# Patient Record
Sex: FEMALE | Race: WHITE | NOT HISPANIC OR LATINO | Employment: UNEMPLOYED | URBAN - METROPOLITAN AREA
[De-identification: names, ages, dates, MRNs, and addresses within clinical notes are randomized per-mention and may not be internally consistent; named-entity substitution may affect disease eponyms.]

---

## 2017-08-01 ENCOUNTER — HOSPITAL ENCOUNTER (EMERGENCY)
Facility: HOSPITAL | Age: 42
Discharge: HOME | End: 2017-08-01
Attending: EMERGENCY MEDICINE
Payer: MEDICAID

## 2017-08-01 VITALS
DIASTOLIC BLOOD PRESSURE: 84 MMHG | TEMPERATURE: 99 F | RESPIRATION RATE: 18 BRPM | HEIGHT: 68 IN | HEART RATE: 73 BPM | WEIGHT: 123 LBS | SYSTOLIC BLOOD PRESSURE: 120 MMHG | BODY MASS INDEX: 18.64 KG/M2 | OXYGEN SATURATION: 99 %

## 2017-08-01 DIAGNOSIS — N92.1 METRORRHAGIA: Primary | ICD-10-CM

## 2017-08-01 LAB
ALBUMIN SERPL BCP-MCNC: 3.7 G/DL
ALP SERPL-CCNC: 60 U/L
ALT SERPL W/O P-5'-P-CCNC: 15 U/L
ANION GAP SERPL CALC-SCNC: 10 MMOL/L
AST SERPL-CCNC: 18 U/L
B-HCG UR QL: NEGATIVE
BACTERIA #/AREA URNS AUTO: ABNORMAL /HPF
BASOPHILS # BLD AUTO: 0.02 K/UL
BASOPHILS NFR BLD: 0.2 %
BILIRUB SERPL-MCNC: 0.4 MG/DL
BILIRUB UR QL STRIP: NEGATIVE
BUN SERPL-MCNC: 8 MG/DL
CALCIUM SERPL-MCNC: 9.2 MG/DL
CHLORIDE SERPL-SCNC: 109 MMOL/L
CLARITY UR REFRACT.AUTO: ABNORMAL
CO2 SERPL-SCNC: 20 MMOL/L
COLOR UR AUTO: ABNORMAL
CREAT SERPL-MCNC: 0.8 MG/DL
DIFFERENTIAL METHOD: ABNORMAL
EOSINOPHIL # BLD AUTO: 0.1 K/UL
EOSINOPHIL NFR BLD: 0.8 %
ERYTHROCYTE [DISTWIDTH] IN BLOOD BY AUTOMATED COUNT: 12.2 %
EST. GFR  (AFRICAN AMERICAN): >60 ML/MIN/1.73 M^2
EST. GFR  (NON AFRICAN AMERICAN): >60 ML/MIN/1.73 M^2
GLUCOSE SERPL-MCNC: 114 MG/DL
GLUCOSE UR QL STRIP: NEGATIVE
HCT VFR BLD AUTO: 44.6 %
HGB BLD-MCNC: 15.1 G/DL
HGB UR QL STRIP: ABNORMAL
KETONES UR QL STRIP: NEGATIVE
LEUKOCYTE ESTERASE UR QL STRIP: NEGATIVE
LYMPHOCYTES # BLD AUTO: 1.9 K/UL
LYMPHOCYTES NFR BLD: 21.7 %
MCH RBC QN AUTO: 33.6 PG
MCHC RBC AUTO-ENTMCNC: 33.9 G/DL
MCV RBC AUTO: 99 FL
MICROSCOPIC COMMENT: ABNORMAL
MONOCYTES # BLD AUTO: 0.5 K/UL
MONOCYTES NFR BLD: 5.5 %
NEUTROPHILS # BLD AUTO: 6.3 K/UL
NEUTROPHILS NFR BLD: 71.7 %
NITRITE UR QL STRIP: NEGATIVE
PH UR STRIP: 7 [PH] (ref 5–8)
PLATELET # BLD AUTO: 198 K/UL
PMV BLD AUTO: 10.1 FL
POTASSIUM SERPL-SCNC: 4.2 MMOL/L
PROT SERPL-MCNC: 6.9 G/DL
PROT UR QL STRIP: ABNORMAL
RBC # BLD AUTO: 4.49 M/UL
RBC #/AREA URNS AUTO: >100 /HPF (ref 0–4)
SODIUM SERPL-SCNC: 139 MMOL/L
SP GR UR STRIP: 1.02 (ref 1–1.03)
SQUAMOUS #/AREA URNS AUTO: 4 /HPF
URN SPEC COLLECT METH UR: ABNORMAL
UROBILINOGEN UR STRIP-ACNC: NEGATIVE EU/DL
WBC # BLD AUTO: 8.85 K/UL
WBC #/AREA URNS AUTO: 4 /HPF (ref 0–5)

## 2017-08-01 PROCEDURE — 81000 URINALYSIS NONAUTO W/SCOPE: CPT

## 2017-08-01 PROCEDURE — 81025 URINE PREGNANCY TEST: CPT

## 2017-08-01 PROCEDURE — 99284 EMERGENCY DEPT VISIT MOD MDM: CPT

## 2017-08-01 PROCEDURE — 80053 COMPREHEN METABOLIC PANEL: CPT

## 2017-08-01 PROCEDURE — 85025 COMPLETE CBC W/AUTO DIFF WBC: CPT

## 2017-08-01 RX ORDER — MEDROXYPROGESTERONE ACETATE 5 MG/1
10 TABLET ORAL DAILY
Qty: 20 TABLET | Refills: 0 | Status: SHIPPED | OUTPATIENT
Start: 2017-08-01 | End: 2023-04-10

## 2017-08-01 NOTE — MEDICAL/APP STUDENT
"  History     Chief Complaint   Patient presents with    Vaginal Bleeding     bleeding for 22 days     HPI    Ms. Zepeda is a 41 yr old white female who presents to the ED with complaint of vaginal bleeding which first started on July 13th. She reports suprapubic pain that does not radiate. She confirms copious amounts of vaginal bleeding with clots. She denies fever, chills, and NVD. Ms. Zepeda is afebrile and denies possibility of pregnancy as she had a tubal ligation.        Past Surgical History:   Procedure Laterality Date    TUBAL LIGATION         No family history on file.    Social History   Substance Use Topics    Smoking status: Current Every Day Smoker     Packs/day: 1.00     Types: Cigarettes    Smokeless tobacco: Not on file    Alcohol use No       Review of Systems   Constitutional: Positive for chills. Negative for diaphoresis, fatigue and fever.   Respiratory: Negative for chest tightness, shortness of breath, wheezing and stridor.    Gastrointestinal: Negative for abdominal pain, constipation, diarrhea, nausea and vomiting.   Endocrine: Positive for polyuria.   Genitourinary: Positive for frequency, pelvic pain and vaginal bleeding. Negative for difficulty urinating and flank pain.   Musculoskeletal: Negative for neck pain and neck stiffness.   Skin: Negative for color change, pallor and rash.   Neurological: Positive for numbness and headaches. Negative for dizziness and weakness.        Numbness in bilateral lower extremities.       Physical Exam   /76 (BP Location: Right arm, Patient Position: Lying)   Pulse 61   Temp 98.5 °F (36.9 °C) (Oral)   Resp 18   Ht 5' 8" (1.727 m)   Wt 55.8 kg (123 lb)   SpO2 99%   BMI 18.70 kg/m²     Physical Exam    Constitutional: She appears well-developed and well-nourished. She is not diaphoretic. No distress.   HENT:   Head: Normocephalic and atraumatic.   Eyes: Conjunctivae and EOM are normal. Pupils are equal, round, and reactive to light. "   Neck: Normal range of motion. Neck supple.   Cardiovascular: Normal rate, regular rhythm and normal heart sounds.   No murmur heard.  Pulmonary/Chest: Breath sounds normal. No respiratory distress. She has no wheezes. She exhibits no tenderness.   Abdominal: Soft. Bowel sounds are normal. There is tenderness in the suprapubic area.         ED Course     1. Vaginal Bleeding   - Order UA   - Order pregnancy test  2. Pain in suprapubic area   -ultrasound of abdomen

## 2017-08-01 NOTE — ED NOTES
IV attempted. Pt crying and moving around. Attempt unsuccessful. Blood obtained from attempt and sent to lab.

## 2017-08-01 NOTE — ED NOTES
Patient reports vaginal bleeding onset 7/13/2017. She reports last GYN appt several years ago. Skin W/D, resp even and unlabored. amb with steady gait.

## 2017-08-01 NOTE — ED PROVIDER NOTES
Encounter Date: 8/1/2017       History     Chief Complaint   Patient presents with    Vaginal Bleeding     bleeding for 22 days     The history is provided by the patient.   Vaginal Bleeding   This is a new problem. The current episode started more than 1 week ago. The problem occurs constantly. The problem has not changed since onset.Pertinent negatives include no chest pain, no abdominal pain, no headaches and no shortness of breath. Nothing aggravates the symptoms. Nothing relieves the symptoms. She has tried nothing for the symptoms.     Review of patient's allergies indicates:   Allergen Reactions    Pcn [penicillins] Swelling     No past medical history on file.  Past Surgical History:   Procedure Laterality Date    TUBAL LIGATION       No family history on file.  Social History   Substance Use Topics    Smoking status: Current Every Day Smoker     Packs/day: 1.00     Types: Cigarettes    Smokeless tobacco: Not on file    Alcohol use No     Review of Systems   Constitutional: Negative for fever.   HENT: Negative for sore throat.    Respiratory: Negative for shortness of breath.    Cardiovascular: Negative for chest pain.   Gastrointestinal: Negative for abdominal pain and nausea.   Genitourinary: Positive for vaginal bleeding. Negative for dysuria.   Musculoskeletal: Negative for back pain.   Skin: Negative for rash.   Neurological: Negative for weakness and headaches.   Hematological: Does not bruise/bleed easily.       Physical Exam     Initial Vitals [08/01/17 1106]   BP Pulse Resp Temp SpO2   (!) 152/75 64 18 98.5 °F (36.9 °C) 99 %      MAP       100.67         Physical Exam    Nursing note and vitals reviewed.  Constitutional: She appears well-developed and well-nourished. No distress.   HENT:   Head: Normocephalic and atraumatic.   Mouth/Throat: Oropharynx is clear and moist.   Eyes: Conjunctivae and EOM are normal. Pupils are equal, round, and reactive to light.   Neck: Normal range of motion.  "Neck supple.   Cardiovascular: Normal rate, regular rhythm and normal heart sounds. Exam reveals no gallop and no friction rub.    No murmur heard.  Pulmonary/Chest: Breath sounds normal. No respiratory distress. She has no wheezes. She has no rhonchi. She has no rales.   Abdominal: Soft. Bowel sounds are normal. She exhibits no distension and no mass. There is no tenderness. There is no rebound and no guarding.   Genitourinary: Cervix exhibits no motion tenderness. Right adnexum displays no mass and no tenderness. Left adnexum displays no mass and no tenderness. There is bleeding in the vagina. No tenderness in the vagina. No vaginal discharge found.   Musculoskeletal: Normal range of motion. She exhibits no edema or tenderness.   Neurological: She is alert and oriented to person, place, and time. She has normal strength.   Skin: Skin is warm and dry. No rash noted.   Psychiatric: She has a normal mood and affect. Thought content normal.         ED Course   Procedures  Labs Reviewed   CBC W/ AUTO DIFFERENTIAL - Abnormal; Notable for the following:        Result Value    MCV 99 (*)     MCH 33.6 (*)     All other components within normal limits   URINALYSIS - Abnormal; Notable for the following:     Color, UA Orange (*)     Appearance, UA Hazy (*)     Protein, UA Trace (*)     Occult Blood UA 3+ (*)     All other components within normal limits   URINALYSIS MICROSCOPIC - Abnormal; Notable for the following:     RBC, UA >100 (*)     All other components within normal limits   PREGNANCY TEST, URINE RAPID   COMPREHENSIVE METABOLIC PANEL   COMPREHENSIVE METABOLIC PANEL        ED Vital Signs:  Vitals:    08/01/17 1106 08/01/17 1134 08/01/17 1135 08/01/17 1136   BP: (!) 152/75 130/76 129/83 120/84   Pulse: 64 61 68 73   Resp: 18      Temp: 98.5 °F (36.9 °C)      TempSrc: Oral      SpO2: 99%      Weight: 55.8 kg (123 lb)      Height: 5' 8" (1.727 m)            Abnormal Lab Results:  Labs Reviewed   CBC W/ AUTO DIFFERENTIAL " - Abnormal; Notable for the following:        Result Value    MCV 99 (*)     MCH 33.6 (*)     All other components within normal limits   URINALYSIS - Abnormal; Notable for the following:     Color, UA Orange (*)     Appearance, UA Hazy (*)     Protein, UA Trace (*)     Occult Blood UA 3+ (*)     All other components within normal limits   URINALYSIS MICROSCOPIC - Abnormal; Notable for the following:     RBC, UA >100 (*)     All other components within normal limits   PREGNANCY TEST, URINE RAPID   COMPREHENSIVE METABOLIC PANEL   COMPREHENSIVE METABOLIC PANEL          All Lab Results:  Results for orders placed or performed during the hospital encounter of 08/01/17   CBC auto differential   Result Value Ref Range    WBC 8.85 3.90 - 12.70 K/uL    RBC 4.49 4.00 - 5.40 M/uL    Hemoglobin 15.1 12.0 - 16.0 g/dL    Hematocrit 44.6 37.0 - 48.5 %    MCV 99 (H) 82 - 98 fL    MCH 33.6 (H) 27.0 - 31.0 pg    MCHC 33.9 32.0 - 36.0 g/dL    RDW 12.2 11.5 - 14.5 %    Platelets 198 150 - 350 K/uL    MPV 10.1 9.2 - 12.9 fL    Gran # 6.3 1.8 - 7.7 K/uL    Lymph # 1.9 1.0 - 4.8 K/uL    Mono # 0.5 0.3 - 1.0 K/uL    Eos # 0.1 0.0 - 0.5 K/uL    Baso # 0.02 0.00 - 0.20 K/uL    Gran% 71.7 38.0 - 73.0 %    Lymph% 21.7 18.0 - 48.0 %    Mono% 5.5 4.0 - 15.0 %    Eosinophil% 0.8 0.0 - 8.0 %    Basophil% 0.2 0.0 - 1.9 %    Differential Method Automated    Urinalysis   Result Value Ref Range    Specimen UA Urine, Clean Catch     Color, UA Orange (A) Yellow, Straw, Lara    Appearance, UA Hazy (A) Clear    pH, UA 7.0 5.0 - 8.0    Specific Gravity, UA 1.025 1.005 - 1.030    Protein, UA Trace (A) Negative    Glucose, UA Negative Negative    Ketones, UA Negative Negative    Bilirubin (UA) Negative Negative    Occult Blood UA 3+ (A) Negative    Nitrite, UA Negative Negative    Urobilinogen, UA Negative <2.0 EU/dL    Leukocytes, UA Negative Negative   Pregnancy, urine rapid   Result Value Ref Range    Preg Test, Ur Negative    Urinalysis Microscopic    Result Value Ref Range    RBC, UA >100 (H) 0 - 4 /hpf    WBC, UA 4 0 - 5 /hpf    Bacteria, UA Rare None-Occ /hpf    Squam Epithel, UA 4 /hpf    Microscopic Comment SEE COMMENT            Imaging Results:  Imaging Results    None            The Emergency Provider reviewed the vital signs and test results, which are outlined above.    ED Discussions:  12:14 PM: Reassessed pt at this time.  Pt states her condition has improved at this time. Discussed with pt all pertinent ED information and results. Discussed pt dx of metrorrhagia and plan of tx. Gave pt all f/u and return to the ED instructions. All questions and concerns were addressed at this time. Pt expresses understanding of information and instructions, and is comfortable with plan to discharge. Pt is stable for discharge.                                 ED Course     Clinical Impression:       ICD-10-CM ICD-9-CM   1. Metrorrhagia N92.1 626.6         Disposition:   Disposition: Discharged  Condition: Stable                        Maximino Maynard MD  08/01/17 7003

## 2017-10-02 ENCOUNTER — HOSPITAL ENCOUNTER (OUTPATIENT)
Dept: RADIOLOGY | Facility: HOSPITAL | Age: 42
Discharge: HOME | End: 2017-10-02
Payer: MEDICAID

## 2017-10-02 VITALS — WEIGHT: 118 LBS | HEIGHT: 68 IN | BODY MASS INDEX: 17.88 KG/M2

## 2017-10-02 DIAGNOSIS — Z12.39 ENCOUNTER FOR BREAST CANCER SCREENING OTHER THAN MAMMOGRAM: Primary | ICD-10-CM

## 2017-10-02 DIAGNOSIS — Z12.39 ENCOUNTER FOR BREAST CANCER SCREENING OTHER THAN MAMMOGRAM: ICD-10-CM

## 2017-10-02 PROCEDURE — 77067 SCR MAMMO BI INCL CAD: CPT | Mod: 26,,, | Performed by: RADIOLOGY

## 2017-10-02 PROCEDURE — 77063 BREAST TOMOSYNTHESIS BI: CPT | Mod: 26,,, | Performed by: RADIOLOGY

## 2017-10-02 PROCEDURE — 77067 SCR MAMMO BI INCL CAD: CPT | Mod: TC

## 2017-10-06 ENCOUNTER — TELEPHONE (OUTPATIENT)
Dept: RADIOLOGY | Facility: HOSPITAL | Age: 42
End: 2017-10-06

## 2017-10-09 ENCOUNTER — HOSPITAL ENCOUNTER (OUTPATIENT)
Dept: RADIOLOGY | Facility: HOSPITAL | Age: 42
Discharge: HOME | End: 2017-10-09
Attending: NURSE PRACTITIONER
Payer: MEDICAID

## 2017-10-09 DIAGNOSIS — R92.8 ABNORMAL MAMMOGRAM: ICD-10-CM

## 2017-10-09 PROCEDURE — 76642 ULTRASOUND BREAST LIMITED: CPT | Mod: TC,PO,RT

## 2017-10-09 PROCEDURE — 77061 BREAST TOMOSYNTHESIS UNI: CPT | Mod: TC

## 2017-10-09 PROCEDURE — 76642 ULTRASOUND BREAST LIMITED: CPT | Mod: 26,RT,, | Performed by: RADIOLOGY

## 2017-10-09 PROCEDURE — 77065 DX MAMMO INCL CAD UNI: CPT | Mod: 26,,, | Performed by: RADIOLOGY

## 2017-10-09 PROCEDURE — 77061 BREAST TOMOSYNTHESIS UNI: CPT | Mod: 26,,, | Performed by: RADIOLOGY

## 2018-04-03 ENCOUNTER — TELEPHONE (OUTPATIENT)
Dept: RADIOLOGY | Facility: HOSPITAL | Age: 43
End: 2018-04-03

## 2021-11-09 ENCOUNTER — TELEPHONE (OUTPATIENT)
Dept: GASTROENTEROLOGY | Facility: CLINIC | Age: 46
End: 2021-11-09
Payer: MEDICAID

## 2021-11-16 ENCOUNTER — TELEPHONE (OUTPATIENT)
Dept: GASTROENTEROLOGY | Facility: CLINIC | Age: 46
End: 2021-11-16
Payer: MEDICAID

## 2021-11-23 ENCOUNTER — OFFICE VISIT (OUTPATIENT)
Dept: GASTROENTEROLOGY | Facility: CLINIC | Age: 46
End: 2021-11-23
Payer: MEDICAID

## 2021-11-23 VITALS
HEIGHT: 68 IN | DIASTOLIC BLOOD PRESSURE: 78 MMHG | WEIGHT: 127.56 LBS | HEART RATE: 69 BPM | OXYGEN SATURATION: 98 % | SYSTOLIC BLOOD PRESSURE: 138 MMHG | BODY MASS INDEX: 19.33 KG/M2

## 2021-11-23 DIAGNOSIS — Z98.890 HISTORY OF ABDOMINAL SURGERY: ICD-10-CM

## 2021-11-23 DIAGNOSIS — R19.4 CHANGE IN BOWEL HABITS: Primary | ICD-10-CM

## 2021-11-23 DIAGNOSIS — R10.9 ABDOMINAL PAIN, UNSPECIFIED ABDOMINAL LOCATION: ICD-10-CM

## 2021-11-23 PROCEDURE — 99999 PR PBB SHADOW E&M-EST. PATIENT-LVL III: CPT | Mod: PBBFAC,,, | Performed by: INTERNAL MEDICINE

## 2021-11-23 PROCEDURE — 99213 OFFICE O/P EST LOW 20 MIN: CPT | Mod: PBBFAC,PN | Performed by: INTERNAL MEDICINE

## 2021-11-23 PROCEDURE — 99204 OFFICE O/P NEW MOD 45 MIN: CPT | Mod: S$PBB,,, | Performed by: INTERNAL MEDICINE

## 2021-11-23 PROCEDURE — 99204 PR OFFICE/OUTPT VISIT, NEW, LEVL IV, 45-59 MIN: ICD-10-PCS | Mod: S$PBB,,, | Performed by: INTERNAL MEDICINE

## 2021-11-23 PROCEDURE — 99999 PR PBB SHADOW E&M-EST. PATIENT-LVL III: ICD-10-PCS | Mod: PBBFAC,,, | Performed by: INTERNAL MEDICINE

## 2021-11-23 RX ORDER — ASPIRIN 81 MG/1
81 TABLET ORAL
COMMUNITY

## 2021-11-23 RX ORDER — IBUPROFEN 600 MG/1
600 TABLET ORAL 3 TIMES DAILY PRN
COMMUNITY
Start: 2021-09-02

## 2021-11-29 ENCOUNTER — HOSPITAL ENCOUNTER (EMERGENCY)
Facility: HOSPITAL | Age: 46
Discharge: HOME | End: 2021-11-29
Attending: EMERGENCY MEDICINE
Payer: MEDICAID

## 2021-11-29 VITALS
HEART RATE: 62 BPM | SYSTOLIC BLOOD PRESSURE: 185 MMHG | BODY MASS INDEX: 19.27 KG/M2 | RESPIRATION RATE: 18 BRPM | TEMPERATURE: 98 F | WEIGHT: 126.75 LBS | OXYGEN SATURATION: 97 % | DIASTOLIC BLOOD PRESSURE: 93 MMHG

## 2021-11-29 DIAGNOSIS — R10.9 ACUTE RIGHT FLANK PAIN: Primary | ICD-10-CM

## 2021-11-29 LAB
ALBUMIN SERPL BCP-MCNC: 4.2 G/DL (ref 3.5–5.2)
ALP SERPL-CCNC: 66 U/L (ref 55–135)
ALT SERPL W/O P-5'-P-CCNC: 23 U/L (ref 10–44)
ANION GAP SERPL CALC-SCNC: 13 MMOL/L (ref 8–16)
AST SERPL-CCNC: 21 U/L (ref 10–40)
BACTERIA #/AREA URNS AUTO: ABNORMAL /HPF
BASOPHILS # BLD AUTO: 0.02 K/UL (ref 0–0.2)
BASOPHILS NFR BLD: 0.2 % (ref 0–1.9)
BILIRUB SERPL-MCNC: 0.4 MG/DL (ref 0.1–1)
BILIRUB UR QL STRIP: NEGATIVE
BUN SERPL-MCNC: 9 MG/DL (ref 6–20)
CALCIUM SERPL-MCNC: 9.4 MG/DL (ref 8.7–10.5)
CHLORIDE SERPL-SCNC: 108 MMOL/L (ref 95–110)
CLARITY UR REFRACT.AUTO: ABNORMAL
CO2 SERPL-SCNC: 21 MMOL/L (ref 23–29)
COLOR UR AUTO: YELLOW
CREAT SERPL-MCNC: 0.8 MG/DL (ref 0.5–1.4)
DIFFERENTIAL METHOD: ABNORMAL
EOSINOPHIL # BLD AUTO: 0 K/UL (ref 0–0.5)
EOSINOPHIL NFR BLD: 0.1 % (ref 0–8)
ERYTHROCYTE [DISTWIDTH] IN BLOOD BY AUTOMATED COUNT: 12.2 % (ref 11.5–14.5)
EST. GFR  (AFRICAN AMERICAN): >60 ML/MIN/1.73 M^2
EST. GFR  (NON AFRICAN AMERICAN): >60 ML/MIN/1.73 M^2
GLUCOSE SERPL-MCNC: 121 MG/DL (ref 70–110)
GLUCOSE UR QL STRIP: NEGATIVE
HCT VFR BLD AUTO: 43.7 % (ref 37–48.5)
HGB BLD-MCNC: 14.8 G/DL (ref 12–16)
HGB UR QL STRIP: ABNORMAL
IMM GRANULOCYTES # BLD AUTO: 0.05 K/UL (ref 0–0.04)
IMM GRANULOCYTES NFR BLD AUTO: 0.5 % (ref 0–0.5)
KETONES UR QL STRIP: NEGATIVE
LEUKOCYTE ESTERASE UR QL STRIP: NEGATIVE
LIPASE SERPL-CCNC: 11 U/L (ref 4–60)
LYMPHOCYTES # BLD AUTO: 1.9 K/UL (ref 1–4.8)
LYMPHOCYTES NFR BLD: 17.5 % (ref 18–48)
MCH RBC QN AUTO: 33.9 PG (ref 27–31)
MCHC RBC AUTO-ENTMCNC: 33.9 G/DL (ref 32–36)
MCV RBC AUTO: 100 FL (ref 82–98)
MICROSCOPIC COMMENT: ABNORMAL
MONOCYTES # BLD AUTO: 0.5 K/UL (ref 0.3–1)
MONOCYTES NFR BLD: 4.1 % (ref 4–15)
NEUTROPHILS # BLD AUTO: 8.5 K/UL (ref 1.8–7.7)
NEUTROPHILS NFR BLD: 77.6 % (ref 38–73)
NITRITE UR QL STRIP: NEGATIVE
NRBC BLD-RTO: 0 /100 WBC
PH UR STRIP: 7 [PH] (ref 5–8)
PLATELET # BLD AUTO: 229 K/UL (ref 150–450)
PMV BLD AUTO: 9.5 FL (ref 9.2–12.9)
POTASSIUM SERPL-SCNC: 4.3 MMOL/L (ref 3.5–5.1)
PROT SERPL-MCNC: 7.1 G/DL (ref 6–8.4)
PROT UR QL STRIP: NEGATIVE
RBC # BLD AUTO: 4.36 M/UL (ref 4–5.4)
RBC #/AREA URNS AUTO: 8 /HPF (ref 0–4)
SODIUM SERPL-SCNC: 142 MMOL/L (ref 136–145)
SP GR UR STRIP: 1.02 (ref 1–1.03)
SQUAMOUS #/AREA URNS AUTO: 3 /HPF
URN SPEC COLLECT METH UR: ABNORMAL
UROBILINOGEN UR STRIP-ACNC: NEGATIVE EU/DL
WBC # BLD AUTO: 10.91 K/UL (ref 3.9–12.7)
WBC #/AREA URNS AUTO: 2 /HPF (ref 0–5)

## 2021-11-29 PROCEDURE — 96374 THER/PROPH/DIAG INJ IV PUSH: CPT | Mod: ER

## 2021-11-29 PROCEDURE — 85025 COMPLETE CBC W/AUTO DIFF WBC: CPT | Mod: ER | Performed by: EMERGENCY MEDICINE

## 2021-11-29 PROCEDURE — 96375 TX/PRO/DX INJ NEW DRUG ADDON: CPT | Mod: ER

## 2021-11-29 PROCEDURE — 99284 EMERGENCY DEPT VISIT MOD MDM: CPT | Mod: 25,ER

## 2021-11-29 PROCEDURE — 80053 COMPREHEN METABOLIC PANEL: CPT | Mod: ER | Performed by: EMERGENCY MEDICINE

## 2021-11-29 PROCEDURE — 83690 ASSAY OF LIPASE: CPT | Mod: ER | Performed by: EMERGENCY MEDICINE

## 2021-11-29 PROCEDURE — 63600175 PHARM REV CODE 636 W HCPCS: Mod: ER | Performed by: EMERGENCY MEDICINE

## 2021-11-29 PROCEDURE — 81000 URINALYSIS NONAUTO W/SCOPE: CPT | Mod: ER | Performed by: EMERGENCY MEDICINE

## 2021-11-29 RX ORDER — KETOROLAC TROMETHAMINE 30 MG/ML
15 INJECTION, SOLUTION INTRAMUSCULAR; INTRAVENOUS
Status: COMPLETED | OUTPATIENT
Start: 2021-11-29 | End: 2021-11-29

## 2021-11-29 RX ORDER — MORPHINE SULFATE 4 MG/ML
4 INJECTION, SOLUTION INTRAMUSCULAR; INTRAVENOUS
Status: COMPLETED | OUTPATIENT
Start: 2021-11-29 | End: 2021-11-29

## 2021-11-29 RX ORDER — ONDANSETRON 2 MG/ML
4 INJECTION INTRAMUSCULAR; INTRAVENOUS
Status: COMPLETED | OUTPATIENT
Start: 2021-11-29 | End: 2021-11-29

## 2021-11-29 RX ORDER — KETOROLAC TROMETHAMINE 10 MG/1
10 TABLET, FILM COATED ORAL EVERY 6 HOURS PRN
Qty: 10 TABLET | Refills: 0 | Status: SHIPPED | OUTPATIENT
Start: 2021-11-29 | End: 2023-04-10

## 2021-11-29 RX ADMIN — ONDANSETRON HYDROCHLORIDE 4 MG: 2 SOLUTION INTRAMUSCULAR; INTRAVENOUS at 08:11

## 2021-11-29 RX ADMIN — KETOROLAC TROMETHAMINE 15 MG: 30 INJECTION, SOLUTION INTRAMUSCULAR at 09:11

## 2021-11-29 RX ADMIN — MORPHINE SULFATE 4 MG: 4 INJECTION INTRAVENOUS at 08:11

## 2021-12-13 ENCOUNTER — PES CALL (OUTPATIENT)
Dept: ADMINISTRATIVE | Facility: CLINIC | Age: 46
End: 2021-12-13
Payer: MEDICAID

## 2022-02-16 ENCOUNTER — TELEPHONE (OUTPATIENT)
Dept: GASTROENTEROLOGY | Facility: CLINIC | Age: 47
End: 2022-02-16
Payer: MEDICAID

## 2022-02-16 NOTE — TELEPHONE ENCOUNTER
----- Message from Corrine Gross LPN sent at 2/15/2022  4:26 PM CST -----  Contact: Pt [phone]    ----- Message -----  From: Aminah Weir MD  Sent: 2/15/2022  10:52 AM CST  To: Destin Ghosh    I think it was to see if she could get a colonoscopy. Based on the reports, we can proceed as long as her GYN clears her. It has been 6 months since her GYN surgery so I would think it'll be fine.   ----- Message -----  From: Corrine Gross LPN  Sent: 2/15/2022   9:21 AM CST  To: Aminah Weir MD    Pts records are scanned into media dated 12/10/2021. Ok to proceed w/getting pt scheduled?  ----- Message -----  From: Chanel Sam  Sent: 2/15/2022   9:11 AM CST  To: Destin Ghosh    The patient requested a call back to let her know if you got her records from [facility] yet because, you told her she can't be seen again until you get them.    Thank you

## 2022-12-28 ENCOUNTER — HOSPITAL ENCOUNTER (EMERGENCY)
Facility: HOSPITAL | Age: 47
Discharge: HOME | End: 2022-12-28
Attending: EMERGENCY MEDICINE
Payer: MEDICAID

## 2022-12-28 VITALS
DIASTOLIC BLOOD PRESSURE: 75 MMHG | SYSTOLIC BLOOD PRESSURE: 144 MMHG | RESPIRATION RATE: 16 BRPM | TEMPERATURE: 98 F | WEIGHT: 123.44 LBS | OXYGEN SATURATION: 97 % | HEART RATE: 88 BPM | BODY MASS INDEX: 18.77 KG/M2

## 2022-12-28 DIAGNOSIS — R31.9 URINARY TRACT INFECTION WITH HEMATURIA, SITE UNSPECIFIED: Primary | ICD-10-CM

## 2022-12-28 DIAGNOSIS — B37.31 VAGINAL YEAST INFECTION: ICD-10-CM

## 2022-12-28 DIAGNOSIS — N39.0 URINARY TRACT INFECTION WITH HEMATURIA, SITE UNSPECIFIED: Primary | ICD-10-CM

## 2022-12-28 LAB
B-HCG UR QL: NEGATIVE
BILIRUB UR QL STRIP: NEGATIVE
CLARITY UR REFRACT.AUTO: ABNORMAL
COLOR UR AUTO: YELLOW
GLUCOSE UR QL STRIP: NEGATIVE
HGB UR QL STRIP: ABNORMAL
KETONES UR QL STRIP: NEGATIVE
LEUKOCYTE ESTERASE UR QL STRIP: ABNORMAL
MICROSCOPIC COMMENT: ABNORMAL
NITRITE UR QL STRIP: NEGATIVE
PH UR STRIP: 7 [PH] (ref 5–8)
PROT UR QL STRIP: ABNORMAL
RBC #/AREA URNS AUTO: 10 /HPF (ref 0–4)
SP GR UR STRIP: 1.01 (ref 1–1.03)
URN SPEC COLLECT METH UR: ABNORMAL
UROBILINOGEN UR STRIP-ACNC: NEGATIVE EU/DL
WBC #/AREA URNS AUTO: 8 /HPF (ref 0–5)

## 2022-12-28 PROCEDURE — 81000 URINALYSIS NONAUTO W/SCOPE: CPT | Mod: ER | Performed by: EMERGENCY MEDICINE

## 2022-12-28 PROCEDURE — 99284 EMERGENCY DEPT VISIT MOD MDM: CPT | Mod: ER

## 2022-12-28 PROCEDURE — 81025 URINE PREGNANCY TEST: CPT | Mod: ER | Performed by: EMERGENCY MEDICINE

## 2022-12-28 RX ORDER — FLUCONAZOLE 150 MG/1
150 TABLET ORAL DAILY
Qty: 2 TABLET | Refills: 0 | Status: SHIPPED | OUTPATIENT
Start: 2022-12-28 | End: 2022-12-29

## 2022-12-28 RX ORDER — SULFAMETHOXAZOLE AND TRIMETHOPRIM 800; 160 MG/1; MG/1
1 TABLET ORAL 2 TIMES DAILY
Qty: 14 TABLET | Refills: 0 | Status: SHIPPED | OUTPATIENT
Start: 2022-12-28 | End: 2023-01-04

## 2022-12-28 NOTE — ED PROVIDER NOTES
"Encounter Date: 12/28/2022       History     Chief Complaint   Patient presents with    Dysuria     C/o dysuria, vaginal rash and "yeast" onset 2 days     Sendy Zepeda is a 47 year old female who presents with complaints of dysuria and a vaginal discharge which is white with no odor. Associated symptoms are vaginal itching. She states she has had problems in the past with yeast and this is similar. She saw a GYN for this but has not been able to get in to see them recently.       Review of patient's allergies indicates:   Allergen Reactions    Cefotetan     Pcn [penicillins] Swelling     Past Medical History:   Diagnosis Date    Abnormal Pap smear of cervix     History of other genital system and obstetric disorders 6/17/2021 12:07:29 PM    [facility] Historical - Quick Add: History of abnormal cervical Pap smear-No Additional Notes    History of other genital system and obstetric disorders 6/17/2021 12:07:29 PM    [facility] Historical - Quick Add: History of abnormal cervical Pap smear-No Additional Notes    Salpingo-oophoritis 6/17/2021 12:07:09 PM    [facility] Historical - Quick Add: TOA (tubo-ovarian abscess)-left ovary    Salpingo-oophoritis 6/17/2021 12:07:09 PM    [facility] Historical - Quick Add: TOA (tubo-ovarian abscess)-left ovary    TOA (tubo-ovarian abscess)      Past Surgical History:   Procedure Laterality Date    CERVICAL BIOPSY  W/ LOOP ELECTRODE EXCISION      LEFT OOPHORECTOMY      Laproscopic    RALH/BS      TUBAL LIGATION       Family History   Problem Relation Age of Onset    Heart disease Mother     Ovarian cancer Sister      Social History     Tobacco Use    Smoking status: Every Day     Packs/day: 1.00     Types: Cigarettes    Smokeless tobacco: Never   Substance Use Topics    Alcohol use: No    Drug use: Yes     Types: Marijuana     Review of Systems   Constitutional:  Negative for chills and fever.   HENT:  Negative for sore throat.    Respiratory:  Negative for shortness of " breath.    Cardiovascular:  Negative for chest pain.   Gastrointestinal:  Negative for abdominal pain, constipation, diarrhea, nausea and vomiting.   Genitourinary:  Positive for dysuria, frequency and vaginal discharge. Negative for flank pain, genital sores and pelvic pain.   Musculoskeletal:  Negative for joint swelling.   Skin:  Negative for rash.   Allergic/Immunologic: Negative for immunocompromised state.   Neurological:  Negative for headaches.   Hematological:  Does not bruise/bleed easily.   Psychiatric/Behavioral:  Negative for behavioral problems.      Physical Exam     Initial Vitals [12/28/22 1133]   BP Pulse Resp Temp SpO2   (!) 144/75 88 16 98.3 °F (36.8 °C) 97 %      MAP       --         Physical Exam    Nursing note and vitals reviewed.  Constitutional: She appears well-developed and well-nourished.   HENT:   Head: Normocephalic and atraumatic.   Eyes: Conjunctivae are normal.   Neck:   Normal range of motion.  Cardiovascular:  Normal rate.           Pulmonary/Chest: Breath sounds normal. No respiratory distress.   Abdominal: Abdomen is soft. She exhibits no distension. There is no abdominal tenderness.   No right CVA tenderness.  No left CVA tenderness.   Musculoskeletal:         General: Normal range of motion.      Cervical back: Normal range of motion.     Neurological: She is oriented to person, place, and time.   Skin: Skin is warm and dry.   Psychiatric: She has a normal mood and affect. Thought content normal.       ED Course   Procedures  Labs Reviewed   URINALYSIS, REFLEX TO URINE CULTURE - Abnormal; Notable for the following components:       Result Value    Appearance, UA Hazy (*)     Protein, UA Trace (*)     Occult Blood UA 3+ (*)     Leukocytes, UA 2+ (*)     All other components within normal limits    Narrative:     Specimen Source->Urine   URINALYSIS MICROSCOPIC - Abnormal; Notable for the following components:    RBC, UA 10 (*)     WBC, UA 8 (*)     All other components within  normal limits    Narrative:     Specimen Source->Urine   PREGNANCY TEST, URINE RAPID    Narrative:     Specimen Source->Urine          Imaging Results    None          Medications - No data to display                           Clinical Impression:   Final diagnoses:  [N39.0, R31.9] Urinary tract infection with hematuria, site unspecified (Primary)  [B37.31] Vaginal yeast infection     Consult placed for GYN  Discussed with pt all pertinent ED information and results. Discussed pt dx and plan of tx. Gave pt all f/u and return to the ED instructions. All questions and concerns were addressed at this time. Pt expresses understanding of information and instructions, and is comfortable with plan to discharge. Pt is stable for discharge.    ED Disposition Condition    Discharge Stable          ED Prescriptions       Medication Sig Dispense Start Date End Date Auth. Provider    sulfamethoxazole-trimethoprim 800-160mg (BACTRIM DS) 800-160 mg Tab Take 1 tablet by mouth 2 (two) times daily. for 7 days 14 tablet 12/28/2022 1/4/2023 JOY Bhardwaj    fluconazole (DIFLUCAN) 150 MG Tab Take 1 tablet (150 mg total) by mouth once daily. May repeat in 72 hours if symptoms not resolved. for 1 day 2 tablet 12/28/2022 12/29/2022 JOY Bhardwaj          Follow-up Information       Follow up With Specialties Details Why Contact Info    [facility] - Emergency Dept Emergency Medicine  As needed [id number] [address] [id number] [phone]             JOY Bhardwaj  12/28/22 6400

## 2023-01-01 ENCOUNTER — HOSPITAL ENCOUNTER (EMERGENCY)
Facility: HOSPITAL | Age: 48
Discharge: HOME | End: 2023-01-01
Attending: EMERGENCY MEDICINE
Payer: MEDICAID

## 2023-01-01 VITALS
OXYGEN SATURATION: 98 % | SYSTOLIC BLOOD PRESSURE: 105 MMHG | TEMPERATURE: 98 F | RESPIRATION RATE: 18 BRPM | WEIGHT: 121.25 LBS | DIASTOLIC BLOOD PRESSURE: 61 MMHG | HEART RATE: 81 BPM | BODY MASS INDEX: 18.44 KG/M2

## 2023-01-01 DIAGNOSIS — R10.30 LOWER ABDOMINAL PAIN: Primary | ICD-10-CM

## 2023-01-01 LAB
ALBUMIN SERPL BCP-MCNC: 4.1 G/DL (ref 3.5–5.2)
ALP SERPL-CCNC: 74 U/L (ref 55–135)
ALT SERPL W/O P-5'-P-CCNC: 25 U/L (ref 10–44)
ANION GAP SERPL CALC-SCNC: 11 MMOL/L (ref 8–16)
AST SERPL-CCNC: 18 U/L (ref 10–40)
BACTERIA #/AREA URNS AUTO: ABNORMAL /HPF
BASOPHILS # BLD AUTO: 0.03 K/UL (ref 0–0.2)
BASOPHILS NFR BLD: 0.4 % (ref 0–1.9)
BILIRUB SERPL-MCNC: 0.2 MG/DL (ref 0.1–1)
BILIRUB UR QL STRIP: NEGATIVE
BUN SERPL-MCNC: 20 MG/DL (ref 6–20)
CALCIUM SERPL-MCNC: 9.7 MG/DL (ref 8.7–10.5)
CHLORIDE SERPL-SCNC: 103 MMOL/L (ref 95–110)
CLARITY UR REFRACT.AUTO: ABNORMAL
CO2 SERPL-SCNC: 22 MMOL/L (ref 23–29)
COLOR UR AUTO: YELLOW
CREAT SERPL-MCNC: 1.1 MG/DL (ref 0.5–1.4)
DIFFERENTIAL METHOD: ABNORMAL
EOSINOPHIL # BLD AUTO: 0.1 K/UL (ref 0–0.5)
EOSINOPHIL NFR BLD: 1.3 % (ref 0–8)
ERYTHROCYTE [DISTWIDTH] IN BLOOD BY AUTOMATED COUNT: 11.6 % (ref 11.5–14.5)
EST. GFR  (NO RACE VARIABLE): >60 ML/MIN/1.73 M^2
GLUCOSE SERPL-MCNC: 128 MG/DL (ref 70–110)
GLUCOSE UR QL STRIP: NEGATIVE
HCT VFR BLD AUTO: 42.9 % (ref 37–48.5)
HGB BLD-MCNC: 14.7 G/DL (ref 12–16)
HGB UR QL STRIP: ABNORMAL
IMM GRANULOCYTES # BLD AUTO: 0.02 K/UL (ref 0–0.04)
IMM GRANULOCYTES NFR BLD AUTO: 0.3 % (ref 0–0.5)
KETONES UR QL STRIP: NEGATIVE
LEUKOCYTE ESTERASE UR QL STRIP: NEGATIVE
LIPASE SERPL-CCNC: 23 U/L (ref 4–60)
LYMPHOCYTES # BLD AUTO: 2.5 K/UL (ref 1–4.8)
LYMPHOCYTES NFR BLD: 33.6 % (ref 18–48)
MCH RBC QN AUTO: 33.6 PG (ref 27–31)
MCHC RBC AUTO-ENTMCNC: 34.3 G/DL (ref 32–36)
MCV RBC AUTO: 98 FL (ref 82–98)
MICROSCOPIC COMMENT: ABNORMAL
MONOCYTES # BLD AUTO: 0.5 K/UL (ref 0.3–1)
MONOCYTES NFR BLD: 7.1 % (ref 4–15)
NEUTROPHILS # BLD AUTO: 4.3 K/UL (ref 1.8–7.7)
NEUTROPHILS NFR BLD: 57.3 % (ref 38–73)
NITRITE UR QL STRIP: NEGATIVE
NRBC BLD-RTO: 0 /100 WBC
PH UR STRIP: 6.5 [PH] (ref 5–8)
PLATELET # BLD AUTO: 200 K/UL (ref 150–450)
PMV BLD AUTO: 9.2 FL (ref 9.2–12.9)
POTASSIUM SERPL-SCNC: 4.3 MMOL/L (ref 3.5–5.1)
PROT SERPL-MCNC: 7.4 G/DL (ref 6–8.4)
PROT UR QL STRIP: NEGATIVE
RBC # BLD AUTO: 4.37 M/UL (ref 4–5.4)
RBC #/AREA URNS AUTO: 6 /HPF (ref 0–4)
SODIUM SERPL-SCNC: 136 MMOL/L (ref 136–145)
SP GR UR STRIP: 1.02 (ref 1–1.03)
SQUAMOUS #/AREA URNS AUTO: 1 /HPF
URN SPEC COLLECT METH UR: ABNORMAL
UROBILINOGEN UR STRIP-ACNC: NEGATIVE EU/DL
WBC # BLD AUTO: 7.49 K/UL (ref 3.9–12.7)
WBC #/AREA URNS AUTO: 1 /HPF (ref 0–5)

## 2023-01-01 PROCEDURE — 81000 URINALYSIS NONAUTO W/SCOPE: CPT | Mod: ER | Performed by: PHYSICIAN ASSISTANT

## 2023-01-01 PROCEDURE — 25500020 PHARM REV CODE 255: Mod: ER | Performed by: PHYSICIAN ASSISTANT

## 2023-01-01 PROCEDURE — 99285 EMERGENCY DEPT VISIT HI MDM: CPT | Mod: 25,ER

## 2023-01-01 PROCEDURE — 85025 COMPLETE CBC W/AUTO DIFF WBC: CPT | Mod: ER | Performed by: PHYSICIAN ASSISTANT

## 2023-01-01 PROCEDURE — 83690 ASSAY OF LIPASE: CPT | Mod: ER | Performed by: PHYSICIAN ASSISTANT

## 2023-01-01 PROCEDURE — 80053 COMPREHEN METABOLIC PANEL: CPT | Mod: ER | Performed by: PHYSICIAN ASSISTANT

## 2023-01-01 RX ADMIN — IOHEXOL 75 ML: 350 INJECTION, SOLUTION INTRAVENOUS at 03:01

## 2023-01-02 NOTE — ED PROVIDER NOTES
History      Chief Complaint   Patient presents with    Female  Problem     States was seen on Tuesday, dx w/ UTI. States feel like pressure in vaginal area since Tuesday. States on Bactrim.        Review of patient's allergies indicates:   Allergen Reactions    Cefotetan     Pcn [penicillins] Swelling        HPI   HPI    1/1/2023, 6:15 PM   History obtained from the patient      History of Present Illness: Sendy Zepeda is a 47 y.o. female patient who presents to the Emergency Department for pressure to rectum, vaginal area since Tuesday.  Taking Bactrim for UTI.  She says her large [employer] Thakkar landed on her right lower quadrant a few days ago and she is afraid that she has internal injury.  Symptoms are moderate in severity.     No further complaints or concerns at this time.           PCP: Primary Doctor No       Past Medical History:  Past Medical History:   Diagnosis Date    Abnormal Pap smear of cervix     History of other genital system and obstetric disorders 6/17/2021 12:07:29 PM    [facility] [employer] Historical - Quick Add: History of abnormal cervical Pap smear-No Additional Notes    History of other genital system and obstetric disorders 6/17/2021 12:07:29 PM    [facility] Historical - Quick Add: History of abnormal cervical Pap smear-No Additional Notes    Salpingo-oophoritis 6/17/2021 12:07:09 PM    [facility] Historical - Quick Add: TOA (tubo-ovarian abscess)-left ovary    Salpingo-oophoritis 6/17/2021 12:07:09 PM    [facility] [employer] Historical - Quick Add: TOA (tubo-ovarian abscess)-left ovary    TOA (tubo-ovarian abscess)          Past Surgical History:  Past Surgical History:   Procedure Laterality Date    CERVICAL BIOPSY  W/ LOOP ELECTRODE EXCISION      LEFT OOPHORECTOMY      Laproscopic    RALH/BS      TUBAL LIGATION             Family History:  Family History   Problem Relation Age of Onset    Heart disease Mother     Ovarian cancer Sister            Social History:  Social History      Tobacco Use    Smoking status: Every Day     Packs/day: 1.00     Types: Cigarettes    Smokeless tobacco: Never   Substance and Sexual Activity    Alcohol use: No    Drug use: Yes     Types: Marijuana    Sexual activity: Never     Partners: Male       ROS     Review of Systems   Constitutional:  Negative for chills and fever.   HENT:  Negative for facial swelling and trouble swallowing.    Eyes:  Negative for discharge, redness and visual disturbance.   Respiratory:  Negative for chest tightness and shortness of breath.    Cardiovascular:  Negative for chest pain and leg swelling.   Gastrointestinal:  Positive for abdominal pain. Negative for diarrhea and vomiting.   Genitourinary:  Negative for decreased urine volume and dysuria.   Musculoskeletal:  Negative for joint swelling and neck stiffness.   Skin:  Negative for rash and wound.   Neurological:  Negative for syncope and facial asymmetry.   All other systems reviewed and are negative.    Physical Exam      Initial Vitals [01/01/23 1329]   BP Pulse Resp Temp SpO2   132/81 98 20 98.1 °F (36.7 °C) 98 %      MAP       --         Physical Exam  Vital signs and nursing notes reviewed.  Constitutional: Patient is in NAD. Awake and alert. Well-developed and well-nourished.  Head: Atraumatic. Normocephalic.  Eyes: PERRL. EOM intact. Conjunctivae nl. No scleral icterus.  ENT: Mucous membranes are moist. Oropharynx is clear.  Neck: Supple. No JVD. No lymphadenopathy.  No meningismus  Cardiovascular: Regular rate and rhythm. No murmurs, rubs, or gallops. Distal pulses are 2+ and symmetric.  Pulmonary/Chest: No respiratory distress. Clear to auscultation bilaterally. No wheezing, rales, or rhonchi.  Abdominal: Soft. Non-distended.  Mild rlq TTP. No rebound, guarding, or rigidity. Good bowel sounds.  Genitourinary: No CVA tenderness  Musculoskeletal: Moves all extremities. No edema.   Skin: Warm and dry.  Neurological: Awake and alert. No acute focal neurological deficits  are appreciated.  Psychiatric: Normal affect. Good eye contact. Appropriate in content.      ED Course          Procedures  ED Vital Signs:  Vitals:    01/01/23 1329 01/01/23 1634   BP: 132/81 105/61   Pulse: 98 81   Resp: 20 18   Temp: 98.1 °F (36.7 °C) 97.6 °F (36.4 °C)   TempSrc: Oral Oral   SpO2: 98% 98%   Weight: 55 kg (121 lb 4.1 oz)          Results for orders placed or performed during the hospital encounter of 01/01/23   CBC W/ AUTO DIFFERENTIAL   Result Value Ref Range    WBC 7.49 3.90 - 12.70 K/uL    RBC 4.37 4.00 - 5.40 M/uL    Hemoglobin 14.7 12.0 - 16.0 g/dL    Hematocrit 42.9 37.0 - 48.5 %    MCV 98 82 - 98 fL    MCH 33.6 (H) 27.0 - 31.0 pg    MCHC 34.3 32.0 - 36.0 g/dL    RDW 11.6 11.5 - 14.5 %    Platelets 200 150 - 450 K/uL    MPV 9.2 9.2 - 12.9 fL    Immature Granulocytes 0.3 0.0 - 0.5 %    Gran # (ANC) 4.3 1.8 - 7.7 K/uL    Immature Grans (Abs) 0.02 0.00 - 0.04 K/uL    Lymph # 2.5 1.0 - 4.8 K/uL    Mono # 0.5 0.3 - 1.0 K/uL    Eos # 0.1 0.0 - 0.5 K/uL    Baso # 0.03 0.00 - 0.20 K/uL    nRBC 0 0 /100 WBC    Gran % 57.3 38.0 - 73.0 %    Lymph % 33.6 18.0 - 48.0 %    Mono % 7.1 4.0 - 15.0 %    Eosinophil % 1.3 0.0 - 8.0 %    Basophil % 0.4 0.0 - 1.9 %    Differential Method Automated    Comp. Metabolic Panel   Result Value Ref Range    Sodium 136 136 - 145 mmol/L    Potassium 4.3 3.5 - 5.1 mmol/L    Chloride 103 95 - 110 mmol/L    CO2 22 (L) 23 - 29 mmol/L    Glucose 128 (H) 70 - 110 mg/dL    BUN 20 6 - 20 mg/dL    Creatinine 1.1 0.5 - 1.4 mg/dL    Calcium 9.7 8.7 - 10.5 mg/dL    Total Protein 7.4 6.0 - 8.4 g/dL    Albumin 4.1 3.5 - 5.2 g/dL    Total Bilirubin 0.2 0.1 - 1.0 mg/dL    Alkaline Phosphatase 74 55 - 135 U/L    AST 18 10 - 40 U/L    ALT 25 10 - 44 U/L    Anion Gap 11 8 - 16 mmol/L    eGFR >60.0 >60 mL/min/1.73 m^2   Lipase   Result Value Ref Range    Lipase 23 4 - 60 U/L   Urinalysis, Reflex to Urine Culture Urine, Clean Catch    Specimen: Urine   Result Value Ref Range    Specimen UA  Urine, Clean Catch     Color, UA Yellow Yellow, Straw, Lara    Appearance, UA Hazy (A) Clear    pH, UA 6.5 5.0 - 8.0    Specific Gravity, UA 1.020 1.005 - 1.030    Protein, UA Negative Negative    Glucose, UA Negative Negative    Ketones, UA Negative Negative    Bilirubin (UA) Negative Negative    Occult Blood UA 3+ (A) Negative    Nitrite, UA Negative Negative    Urobilinogen, UA Negative <2.0 EU/dL    Leukocytes, UA Negative Negative   Urinalysis Microscopic   Result Value Ref Range    RBC, UA 6 (H) 0 - 4 /hpf    WBC, UA 1 0 - 5 /hpf    Bacteria Rare None-Occ /hpf    Squam Epithel, UA 1 /hpf    Microscopic Comment SEE COMMENT              Imaging Results:  Imaging Results              CT Abdomen Pelvis With Contrast (Final result)  Result time 01/01/23 16:17:26      Final result by Homa Hernadez MD (01/01/23 16:17:26)                   Impression:      No acute or adverse abdominopelvic finding    All CT scans at this facility use dose modulation, iterative reconstruction, and/or weight based dosing when appropriate to reduce radiation dose to as low as reasonable achievable.      Electronically signed by: Homa Hernadez  Date:    01/01/2023  Time:    16:17               Narrative:    EXAMINATION:  CT ABDOMEN PELVIS WITH CONTRAST    CLINICAL HISTORY:  RLQ abdominal pain (Age >= 14y);Abdominal trauma, blunt;    TECHNIQUE:  Enhanced intravenous contrast, 2D reformations provided    COMPARISON:  No obstructive bowel findings.  No overt signs of appendicitis    Calcific atherosclerosis with bilateral iliac arterial stents.    Urinary bladder partially distended    FINDINGS:  Liver stable size with several small circumscribed hypodensity subcentimeter likely benign cysts similar to prior unenhanced imaging    Gallbladder present    Spleen normal size    Kidneys without hydronephrosis    No obstructive bowel findings or signs of appendicitis.    Urinary bladder partially distended    Calcific plaquing and  bilateral iliac arterial stents    No overt displaced fracture identified on soft tissue algorithm                                         The Emergency Provider reviewed the vital signs and test results, which are outlined above.    ED Discussion             Medication(s) given in the ER:  Medications   iohexoL (OMNIPAQUE 350) injection 75 mL (75 mLs Intravenous Given 1/1/23 9236)            Follow-up Information       Your Primary Care Doctor In 2 days.                                    Medication List        ASK your doctor about these medications      aspirin 81 MG EC tablet  Commonly known as: ECOTRIN      MG tablet  Generic drug: ibuprofen     ketorolac 10 mg tablet  Commonly known as: TORADOL  Take 1 tablet (10 mg total) by mouth every 6 (six) hours as needed for Pain.     medroxyPROGESTERone 5 MG tablet  Commonly known as: PROVERA  Take 2 tablets (10 mg total) by mouth once daily.     sulfamethoxazole-trimethoprim 800-160mg 800-160 mg Tab  Commonly known as: BACTRIM DS  Take 1 tablet by mouth 2 (two) times daily. for 7 days                  Medical Decision Making        All findings were reviewed with the patient/family in detail.   All remaining questions and concerns were addressed at that time.  Patient/family has been counseled regarding the need for follow-up as well as the indication to return to the emergency room should new or worrisome developments occur.        MDM                 Clinical Impression:        ICD-10-CM ICD-9-CM   1. Lower abdominal pain  R10.30 789.09               Cammie Luu PA-C  01/01/23 1848

## 2023-02-05 ENCOUNTER — HOSPITAL ENCOUNTER (EMERGENCY)
Facility: HOSPITAL | Age: 48
Discharge: HOME | End: 2023-02-05
Attending: EMERGENCY MEDICINE
Payer: MEDICAID

## 2023-02-05 VITALS
HEIGHT: 63 IN | DIASTOLIC BLOOD PRESSURE: 67 MMHG | WEIGHT: 123.56 LBS | OXYGEN SATURATION: 97 % | TEMPERATURE: 98 F | BODY MASS INDEX: 21.89 KG/M2 | RESPIRATION RATE: 16 BRPM | SYSTOLIC BLOOD PRESSURE: 123 MMHG | HEART RATE: 71 BPM

## 2023-02-05 DIAGNOSIS — B96.89 BACTERIAL VAGINOSIS: ICD-10-CM

## 2023-02-05 DIAGNOSIS — N76.0 BACTERIAL VAGINOSIS: ICD-10-CM

## 2023-02-05 DIAGNOSIS — N76.1 SUBACUTE VAGINITIS: Primary | ICD-10-CM

## 2023-02-05 LAB
B-HCG UR QL: NEGATIVE
BACTERIA GENITAL QL WET PREP: ABNORMAL
BILIRUB UR QL STRIP: NEGATIVE
CLARITY UR REFRACT.AUTO: ABNORMAL
CLUE CELLS VAG QL WET PREP: ABNORMAL
COLOR UR AUTO: YELLOW
FILAMENT FUNGI VAG WET PREP-#/AREA: ABNORMAL
GLUCOSE UR QL STRIP: NEGATIVE
HGB UR QL STRIP: ABNORMAL
KETONES UR QL STRIP: NEGATIVE
LEUKOCYTE ESTERASE UR QL STRIP: NEGATIVE
NITRITE UR QL STRIP: NEGATIVE
PH UR STRIP: 7 [PH] (ref 5–8)
PROT UR QL STRIP: ABNORMAL
SP GR UR STRIP: 1.01 (ref 1–1.03)
SPECIMEN SOURCE: ABNORMAL
T VAGINALIS GENITAL QL WET PREP: ABNORMAL
URN SPEC COLLECT METH UR: ABNORMAL
UROBILINOGEN UR STRIP-ACNC: NEGATIVE EU/DL
WBC #/AREA VAG WET PREP: ABNORMAL
YEAST GENITAL QL WET PREP: ABNORMAL

## 2023-02-05 PROCEDURE — 81003 URINALYSIS AUTO W/O SCOPE: CPT | Mod: ER | Performed by: EMERGENCY MEDICINE

## 2023-02-05 PROCEDURE — 87210 SMEAR WET MOUNT SALINE/INK: CPT | Mod: ER | Performed by: EMERGENCY MEDICINE

## 2023-02-05 PROCEDURE — 99284 EMERGENCY DEPT VISIT MOD MDM: CPT | Mod: ER

## 2023-02-05 PROCEDURE — 87491 CHLMYD TRACH DNA AMP PROBE: CPT | Performed by: EMERGENCY MEDICINE

## 2023-02-05 PROCEDURE — 81025 URINE PREGNANCY TEST: CPT | Mod: ER | Performed by: EMERGENCY MEDICINE

## 2023-02-05 PROCEDURE — 87591 N.GONORRHOEAE DNA AMP PROB: CPT | Performed by: EMERGENCY MEDICINE

## 2023-02-05 RX ORDER — METRONIDAZOLE 500 MG/1
500 TABLET ORAL EVERY 12 HOURS
Qty: 14 TABLET | Refills: 0 | Status: SHIPPED | OUTPATIENT
Start: 2023-02-05 | End: 2023-02-12

## 2023-02-05 RX ORDER — ESTRADIOL 0.1 MG/G
CREAM VAGINAL
Qty: 42.5 G | Refills: 0 | Status: SHIPPED | OUTPATIENT
Start: 2023-02-05

## 2023-02-05 NOTE — ED PROVIDER NOTES
"Encounter Date: 2/5/2023       History     Chief Complaint   Patient presents with    Urinary Tract Infection     Going on 3+ weeks, pt states she drinks an excessive amount of Dr Pepper and Red Bull; burning with urination, dizziness, finished abx 1.5 weeks ago     Patient is a 47-year-old female who presents today with complaints of approximately 6 weeks of dysuria.  Patient states the symptoms started 1 night after she used a new soap.  Associated symptoms include vaginal discharge, vaginal itching, vaginal burning.  She reports a burning sensation even when not urinating.  She states that it hurts just to wear underwear.  She has been to the emergency department multiple times.  She was diagnosed with a urinary tract infection in initially and prescribed Bactrim.  She was also prescribed Diflucan.  She has seen OBGYN twice.  She was prescribed estradiol cream without relief.  She then had an OBGYN appointment, where she reports she had a pelvic exam performed and was informed that everything was normal.  Symptoms never resolved.  Denies any flank pain, fever/chills, abdominal pain, vomiting.  She does report some nausea.  She does have a history of hysterectomy.  She states she is not sexually active, but does want to be tested for "everything."    Review of patient's allergies indicates:   Allergen Reactions    Cefotetan     Pcn [penicillins] Swelling     Past Medical History:   Diagnosis Date    Abnormal Pap smear of cervix     History of other genital system and obstetric disorders 6/17/2021 12:07:29 PM    [facility] [employer] Historical - Quick Add: History of abnormal cervical Pap smear-No Additional Notes    History of other genital system and obstetric disorders 6/17/2021 12:07:29 PM    [facility] [employer] Historical - Quick Add: History of abnormal cervical Pap smear-No Additional Notes    Salpingo-oophoritis 6/17/2021 12:07:09 PM    [facility] [employer] Historical - Quick Add: TOA (tubo-ovarian abscess)-left ovary    " Salpingo-oophoritis 6/17/2021 12:07:09 PM    [facility] Historical - Quick Add: TOA (tubo-ovarian abscess)-left ovary    TOA (tubo-ovarian abscess)      Past Surgical History:   Procedure Laterality Date    CERVICAL BIOPSY  W/ LOOP ELECTRODE EXCISION      LEFT OOPHORECTOMY      Laproscopic    RALH/BS      TUBAL LIGATION       Family History   Problem Relation Age of Onset    Heart disease Mother     Ovarian cancer Sister      Social History     Tobacco Use    Smoking status: Every Day     Packs/day: 0.20     Types: Cigarettes    Smokeless tobacco: Never   Substance Use Topics    Alcohol use: No    Drug use: Yes     Types: Marijuana     Comment: every now and then     Review of Systems  See HPI.  Otherwise review of systems negative    Physical Exam     Initial Vitals [02/05/23 1644]   BP Pulse Resp Temp SpO2   123/67 71 16 97.9 °F (36.6 °C) 97 %      MAP       --         Physical Exam    Nursing note and vitals reviewed.  Constitutional: She appears well-developed and well-nourished. No distress.   HENT:   Head: Normocephalic and atraumatic.   Mouth/Throat: Oropharynx is clear and moist.   Eyes: Conjunctivae and EOM are normal. Pupils are equal, round, and reactive to light.   Neck: Neck supple. No tracheal deviation present.   Cardiovascular:  Normal rate, regular rhythm, normal heart sounds and intact distal pulses.           Pulmonary/Chest: Breath sounds normal. No respiratory distress.   Abdominal: Abdomen is soft. She exhibits no distension. There is no abdominal tenderness. There is no rebound and no guarding.   Genitourinary:    Genitourinary Comments: Pelvic exam performed with female chaperone present vulvar erythema with minimal white discharge.  No vaginal bleeding     Musculoskeletal:         General: No tenderness or edema. Normal range of motion.      Cervical back: Neck supple.     Neurological: She is alert and oriented to person, place, and time. GCS score is 15. GCS eye subscore is 4. GCS verbal  subscore is 5. GCS motor subscore is 6.   No focal deficits   Skin: Skin is warm. No rash noted. No erythema.   Psychiatric: She has a normal mood and affect. Her behavior is normal.       ED Course   Procedures  Labs Reviewed   VAGINAL SCREEN - Abnormal; Notable for the following components:       Result Value    Clue Cells Occasional (*)     WBC - Vaginal Screen Occasional (*)     Bacteria - Vaginal Screen Moderate (*)     All other components within normal limits    Narrative:     Release to patient->Immediate   URINALYSIS, REFLEX TO URINE CULTURE - Abnormal; Notable for the following components:    Appearance, UA Hazy (*)     Protein, UA Trace (*)     Occult Blood UA Trace (*)     All other components within normal limits    Narrative:     Specimen Source->Urine   C. TRACHOMATIS/N. GONORRHOEAE BY AMP DNA    Narrative:     Sources by Resulting Lab:->[facility]  Release to patient->Immediate   PREGNANCY TEST, URINE RAPID    Narrative:     Specimen Source->Urine     Results for orders placed or performed during the hospital encounter of 02/05/23   Vaginal Screen    Specimen: Vagina   Result Value Ref Range    Trichomonas None None    Clue Cells Occasional (A) None    Budding Yeast None None    Fungal Hyphae None None    WBC - Vaginal Screen Occasional (A) None    Bacteria - Vaginal Screen Moderate (A) None    Wet Prep Source Vagina    C. trachomatis/N. gonorrhoeae by AMP DNA [place]; Urine    Specimen: Genital   Result Value Ref Range    Chlamydia, Amplified DNA Not Detected Not Detected    N gonorrhoeae, amplified DNA Not Detected Not Detected   Urinalysis, Reflex to Urine Culture Urine, Clean Catch    Specimen: Urine   Result Value Ref Range    Specimen UA Urine, Clean Catch     Color, UA Yellow Yellow, Straw, Lara    Appearance, UA Hazy (A) Clear    pH, UA 7.0 5.0 - 8.0    Specific Gravity, UA 1.010 1.005 - 1.030    Protein, UA Trace (A) Negative    Glucose, UA Negative Negative    Ketones, UA Negative Negative     Bilirubin (UA) Negative Negative    Occult Blood UA Trace (A) Negative    Nitrite, UA Negative Negative    Urobilinogen, UA Negative <2.0 EU/dL    Leukocytes, UA Negative Negative   Pregnancy, urine rapid   Result Value Ref Range    Preg Test, Ur Negative             Imaging Results    None          Medications - No data to display  Medical Decision Making:   History:   Old Medical Records: I decided to obtain old medical records.  Old Records Summarized: other records.       <> Summary of Records: Prior ED visits reviewed where patient was prescribed Bactrim and Diflucan  Differential Diagnosis:   Vaginitis, urinary tract infection  Clinical Tests:   Lab Tests: Ordered and Reviewed  The following lab test(s) were unremarkable: Urinalysis and UPT       <> Summary of Lab: Wet prep with clue cells and bacteria                        Clinical Impression:   Final diagnoses:  [N76.1] Subacute vaginitis (Primary)  [N76.0, B96.89] Bacterial vaginosis        ED Disposition Condition    Discharge Stable          ED Prescriptions       Medication Sig Dispense Start Date End Date Auth. Provider    metroNIDAZOLE (FLAGYL) 500 MG tablet () Take 1 tablet (500 mg total) by mouth every 12 (twelve) hours. for 7 days 14 tablet 2023 Duane Gaines MD    estradioL (ESTRACE) 0.01 % (0.1 mg/gram) vaginal cream 1 g intravaginally daily for 2 weeks, then 1 gram intravaginally 2-3 times per week 42.5 g 2023 -- Duane Gaines MD          Follow-up Information       Follow up With Specialties Details Why Contact Info    Follow-up with your primary care physician and/or OBGYN        [facility] - Emergency Dept Emergency Medicine  As needed, If symptoms worsen [address]  [facility] [id number] [phone]             Duane Gaines MD  23 2522

## 2023-02-07 LAB
C TRACH DNA SPEC QL NAA+PROBE: NOT DETECTED
N GONORRHOEA DNA SPEC QL NAA+PROBE: NOT DETECTED

## 2023-03-07 ENCOUNTER — HOSPITAL ENCOUNTER (OUTPATIENT)
Dept: RADIOLOGY | Facility: HOSPITAL | Age: 48
Discharge: HOME | End: 2023-03-07
Attending: NURSE PRACTITIONER
Payer: MEDICAID

## 2023-03-07 DIAGNOSIS — R34 OLIGURIA AND ANURIA: ICD-10-CM

## 2023-03-07 PROCEDURE — 74455 X-RAY URETHRA/BLADDER: CPT | Mod: TC

## 2023-03-07 PROCEDURE — 51600 FL CYSTOGRAM VOIDING (VCUG) RADIOLOGIST PERFORMED (XPD): ICD-10-PCS | Mod: ,,, | Performed by: STUDENT IN AN ORGANIZED HEALTH CARE EDUCATION/TRAINING PROGRAM

## 2023-03-07 PROCEDURE — 74455 X-RAY URETHRA/BLADDER: CPT | Mod: 26,,, | Performed by: STUDENT IN AN ORGANIZED HEALTH CARE EDUCATION/TRAINING PROGRAM

## 2023-03-07 PROCEDURE — 25500020 PHARM REV CODE 255

## 2023-03-07 PROCEDURE — 74455 FL CYSTOGRAM VOIDING (VCUG) RADIOLOGIST PERFORMED (XPD): ICD-10-PCS | Mod: 26,,, | Performed by: STUDENT IN AN ORGANIZED HEALTH CARE EDUCATION/TRAINING PROGRAM

## 2023-03-07 PROCEDURE — 51600 INJECTION FOR BLADDER X-RAY: CPT | Mod: ,,, | Performed by: STUDENT IN AN ORGANIZED HEALTH CARE EDUCATION/TRAINING PROGRAM

## 2023-03-07 RX ADMIN — DIATRIZOATE MEGLUMINE 200 ML: 300 INJECTION, SOLUTION INTRAVENOUS at 10:03

## 2023-03-07 RX ADMIN — DIATRIZOATE MEGLUMINE 300 ML: 300 INJECTION, SOLUTION INTRAVENOUS at 10:03

## 2023-04-24 ENCOUNTER — PATIENT MESSAGE (OUTPATIENT)
Dept: GASTROENTEROLOGY | Facility: CLINIC | Age: 48
End: 2023-04-24
Payer: MEDICAID

## 2023-06-16 ENCOUNTER — TELEPHONE (OUTPATIENT)
Dept: GASTROENTEROLOGY | Facility: CLINIC | Age: 48
End: 2023-06-16
Payer: MEDICAID

## 2023-07-03 ENCOUNTER — OFFICE VISIT (OUTPATIENT)
Dept: GASTROENTEROLOGY | Facility: CLINIC | Age: 48
End: 2023-07-03
Payer: MEDICAID

## 2023-07-03 ENCOUNTER — PATIENT MESSAGE (OUTPATIENT)
Dept: GASTROENTEROLOGY | Facility: CLINIC | Age: 48
End: 2023-07-03

## 2023-07-03 VITALS
HEART RATE: 73 BPM | SYSTOLIC BLOOD PRESSURE: 150 MMHG | WEIGHT: 120.38 LBS | HEIGHT: 63 IN | BODY MASS INDEX: 21.33 KG/M2 | DIASTOLIC BLOOD PRESSURE: 82 MMHG

## 2023-07-03 DIAGNOSIS — Z98.890 HISTORY OF ABDOMINAL SURGERY: ICD-10-CM

## 2023-07-03 DIAGNOSIS — K58.1 IRRITABLE BOWEL SYNDROME WITH CONSTIPATION: ICD-10-CM

## 2023-07-03 DIAGNOSIS — Z12.11 SPECIAL SCREENING FOR MALIGNANT NEOPLASMS, COLON: ICD-10-CM

## 2023-07-03 DIAGNOSIS — K64.9 HEMORRHOIDS, UNSPECIFIED HEMORRHOID TYPE: Primary | ICD-10-CM

## 2023-07-03 DIAGNOSIS — Z13.31 POSITIVE DEPRESSION SCREENING: ICD-10-CM

## 2023-07-03 DIAGNOSIS — F17.210 SMOKING 1/2 PACK A DAY OR LESS: ICD-10-CM

## 2023-07-03 PROCEDURE — 1159F PR MEDICATION LIST DOCUMENTED IN MEDICAL RECORD: ICD-10-PCS | Mod: CPTII,,, | Performed by: INTERNAL MEDICINE

## 2023-07-03 PROCEDURE — 1160F PR REVIEW ALL MEDS BY PRESCRIBER/CLIN PHARMACIST DOCUMENTED: ICD-10-PCS | Mod: CPTII,,, | Performed by: INTERNAL MEDICINE

## 2023-07-03 PROCEDURE — 3077F PR MOST RECENT SYSTOLIC BLOOD PRESSURE >= 140 MM HG: ICD-10-PCS | Mod: CPTII,,, | Performed by: INTERNAL MEDICINE

## 2023-07-03 PROCEDURE — 99215 OFFICE O/P EST HI 40 MIN: CPT | Mod: PBBFAC | Performed by: INTERNAL MEDICINE

## 2023-07-03 PROCEDURE — 99999 PR PBB SHADOW E&M-EST. PATIENT-LVL V: CPT | Mod: PBBFAC,,, | Performed by: INTERNAL MEDICINE

## 2023-07-03 PROCEDURE — 3008F BODY MASS INDEX DOCD: CPT | Mod: CPTII,,, | Performed by: INTERNAL MEDICINE

## 2023-07-03 PROCEDURE — 1159F MED LIST DOCD IN RCRD: CPT | Mod: CPTII,,, | Performed by: INTERNAL MEDICINE

## 2023-07-03 PROCEDURE — 99999 PR PBB SHADOW E&M-EST. PATIENT-LVL V: ICD-10-PCS | Mod: PBBFAC,,, | Performed by: INTERNAL MEDICINE

## 2023-07-03 PROCEDURE — 3079F PR MOST RECENT DIASTOLIC BLOOD PRESSURE 80-89 MM HG: ICD-10-PCS | Mod: CPTII,,, | Performed by: INTERNAL MEDICINE

## 2023-07-03 PROCEDURE — 99214 PR OFFICE/OUTPT VISIT, EST, LEVL IV, 30-39 MIN: ICD-10-PCS | Mod: S$PBB,,, | Performed by: INTERNAL MEDICINE

## 2023-07-03 PROCEDURE — 3077F SYST BP >= 140 MM HG: CPT | Mod: CPTII,,, | Performed by: INTERNAL MEDICINE

## 2023-07-03 PROCEDURE — 3008F PR BODY MASS INDEX (BMI) DOCUMENTED: ICD-10-PCS | Mod: CPTII,,, | Performed by: INTERNAL MEDICINE

## 2023-07-03 PROCEDURE — 1160F RVW MEDS BY RX/DR IN RCRD: CPT | Mod: CPTII,,, | Performed by: INTERNAL MEDICINE

## 2023-07-03 PROCEDURE — 3079F DIAST BP 80-89 MM HG: CPT | Mod: CPTII,,, | Performed by: INTERNAL MEDICINE

## 2023-07-03 PROCEDURE — 99214 OFFICE O/P EST MOD 30 MIN: CPT | Mod: S$PBB,,, | Performed by: INTERNAL MEDICINE

## 2023-07-03 RX ORDER — KETOROLAC TROMETHAMINE 10 MG/1
10 TABLET, FILM COATED ORAL DAILY PRN
COMMUNITY
Start: 2023-06-09

## 2023-07-03 NOTE — PROGRESS NOTES
[facility]  Gastroenterology    Patient evaluated at the request of Yessica Jhaveri MD  [address] [place] [address]    PCP: Primary Doctor No    7/3/23        Subjective:   Depression Score: I have reviewed the patient's positive depression score. Recommendation based on score is 4. No need for further recommendations.   Constipation: Patient complains of constipation.  Stool pattern has been 1 firm and pellet like stool(s) per day. Onset was several months ago and Defecation has been difficult, incomplete, and painful. Co-Morbid conditions:irritable bowel syndrome, neuromuscular disease, stress, and a history of vaginal prolapse . Symptoms have been symptoms have progressed to a point and plateaued. Current Health Habits: Eating fiber? Yes. Water intake? Yes. A colonoscopy was recommended last year but she had other health issues that she had to address. No GI bleeding and no other major issues.     Past Medical History:   Diagnosis Date    Abnormal Pap smear of cervix     History of other genital system and obstetric disorders 6/17/2021 12:07:29 PM    [facility] Historical - Quick Add: History of abnormal cervical Pap smear-No Additional Notes    Salpingo-oophoritis 6/17/2021 12:07:09 PM    [facility] Historical - Quick Add: TOA (tubo-ovarian abscess)-left ovary    TOA (tubo-ovarian abscess)        Past Surgical History:   Procedure Laterality Date    CERVICAL BIOPSY  W/ LOOP ELECTRODE EXCISION      LEFT OOPHORECTOMY      Laproscopic    RALH/BS      TUBAL LIGATION         Current Outpatient Medications on File Prior to Visit   Medication Sig Dispense Refill    aspirin (ECOTRIN) 81 MG EC tablet Take 81 mg by mouth.      estradioL (ESTRACE) 0.01 % (0.1 mg/gram) vaginal cream Place 1 g vaginally 3 (three) times a week. Place 1 g vaginally 3 times a week at night. 1 each 3    ketorolac (TORADOL) 10 mg tablet Take 10 mg by mouth daily as needed.      estradioL (ESTRACE)  0.01 % (0.1 mg/gram) vaginal cream 1 g intravaginally daily for 2 weeks, then 1 gram intravaginally 2-3 times per week (Patient not taking: Reported on 4/10/2023) 42.5 g 0     mg tablet Take 600 mg by mouth 3 (three) times daily as needed.       No current facility-administered medications on file prior to visit.       Review of patient's allergies indicates:   Allergen Reactions    Cefotetan     Pcn [penicillins] Swelling       Social History     Socioeconomic History    Marital status:    Tobacco Use    Smoking status: Every Day     Packs/day: 0.50     Types: Cigarettes    Smokeless tobacco: Never   Substance and Sexual Activity    Alcohol use: No    Drug use: Yes     Types: Marijuana     Comment: every now and then    Sexual activity: Not Currently     Partners: Male       Family History   Problem Relation Age of Onset    Heart disease Mother     Ovarian cancer Sister      Review of Systems   Constitutional:  Positive for malaise/fatigue. Negative for chills, fever and weight loss.   HENT:  Negative for congestion, hearing loss, nosebleeds, sinus pain and sore throat.    Eyes:  Negative for blurred vision, double vision, pain and redness.   Respiratory:  Negative for hemoptysis, sputum production, shortness of breath and wheezing.    Cardiovascular:  Negative for chest pain, palpitations, orthopnea, claudication and leg swelling.   Gastrointestinal:  Positive for abdominal pain, constipation and nausea. Negative for blood in stool, diarrhea, heartburn, melena and vomiting.   Genitourinary:  Negative for dysuria, frequency, hematuria and urgency.   Musculoskeletal:  Negative for back pain, joint pain, myalgias and neck pain.   Skin:  Negative for itching and rash.   Neurological:  Negative for dizziness, tingling, tremors, sensory change, seizures, weakness and headaches.   Endo/Heme/Allergies:  Negative for environmental allergies and polydipsia. Does not bruise/bleed easily.    Psychiatric/Behavioral:  Negative for depression, hallucinations, memory loss and suicidal ideas. The patient is nervous/anxious.        Objective:   Vitals:   Vitals:    07/03/23 1111   BP: (!) 150/82   Pulse: 73       Physical Exam  Constitutional:       Appearance: She is well-developed.   HENT:      Head: Normocephalic and atraumatic.   Eyes:      Pupils: Pupils are equal, round, and reactive to light.   Neck:      Thyroid: No thyromegaly.   Cardiovascular:      Rate and Rhythm: Normal rate and regular rhythm.      Heart sounds: Normal heart sounds. No murmur heard.  Pulmonary:      Effort: Pulmonary effort is normal. No respiratory distress.      Breath sounds: Normal breath sounds. No wheezing or rales.   Abdominal:      General: Bowel sounds are normal. There is no distension.      Palpations: Abdomen is soft. There is no mass.      Tenderness: There is no abdominal tenderness.   Musculoskeletal:         General: No tenderness. Normal range of motion.      Cervical back: Normal range of motion and neck supple.   Lymphadenopathy:      Cervical: No cervical adenopathy.   Skin:     General: Skin is warm and dry.      Findings: No erythema.   Neurological:      Mental Status: She is alert and oriented to person, place, and time.      Cranial Nerves: No cranial nerve deficit.      Deep Tendon Reflexes: Reflexes are normal and symmetric.   Psychiatric:         Behavior: Behavior normal.     Lab Results   Component Value Date    WBC 7.49 01/01/2023    HGB 14.7 01/01/2023    HCT 42.9 01/01/2023    MCV 98 01/01/2023     01/01/2023       BMP  Lab Results   Component Value Date     01/01/2023    K 4.3 01/01/2023     01/01/2023    CO2 22 (L) 01/01/2023    BUN 20 01/01/2023    CREATININE 1.1 01/01/2023    CALCIUM 9.7 01/01/2023    ANIONGAP 11 01/01/2023    EGFRNORACEVR >60.0 01/01/2023     No results found for: HAV, HEPAIGM, HEPBIGM, HEPBCAB, HBEAG, HEPCAB  Lab Results   Component Value Date    ALT 25  01/01/2023    AST 18 01/01/2023    ALKPHOS 74 01/01/2023    BILITOT 0.2 01/01/2023         IMPRESSION     Problem List Items Addressed This Visit    None  Visit Diagnoses       Hemorrhoids, unspecified hemorrhoid type    -  Primary    Irritable bowel syndrome with constipation        History of abdominal surgery        Vaginal prolapse    Special screening for malignant neoplasms, colon        Relevant Orders    Ambulatory referral/consult to Endo Procedure     Smoking 1/2 pack a day or less        Relevant Orders    Ambulatory referral/consult to Smoking Cessation Program    Positive depression screening        I have reviewed the positive depression score with the patient and found that no additional intervention is needed at this time.            PLANS:    Hemorrhoids, unspecified hemorrhoid type  -     Ambulatory referral/consult to Gastroenterology    Irritable bowel syndrome with constipation  -     Ambulatory referral/consult to Gastroenterology    History of abdominal surgery  Comments:  Vaginal prolapse    Special screening for malignant neoplasms, colon  -     Ambulatory referral/consult to Endo Procedure ; Future; Expected date: 07/04/2023    Smoking 1/2 pack a day or less  -     Ambulatory referral/consult to Smoking Cessation Program; Future; Expected date: 07/10/2023    Positive depression screening  Comments:  I have reviewed the positive depression score with the patient and found that no additional intervention is needed at this time.    A high fiber diet with plenty of fluids (up to 8 glasses of water daily) is suggested to relieve these symptoms.  Metamucil, 1 tablespoon once or twice daily can be used to keep bowels regular if needed. Risks, benefits and alternative options were discussed with the patient. Risks including but not limited to infection, bleeding, heart or respiratory problems and perforation that may require surgery were all explained to the patient. The patient  had a chance for questions if there were doubts or concerns about the test. The referring provider will be notified that our consultation is complete and available through the patient's records.    Thanks for letting us participate in the care of this nice patient,      Herve Pierce

## 2023-07-03 NOTE — PATIENT INSTRUCTIONS
Sendy Zepeda,    Thanks for visiting with us today. Here are my recommendations:    Hemorrhoids, unspecified hemorrhoid type  -     Ambulatory referral/consult to Gastroenterology    Irritable bowel syndrome with constipation  -     Ambulatory referral/consult to Gastroenterology    History of abdominal surgery  Comments:  Vaginal prolapse    Special screening for malignant neoplasms, colon  -     Ambulatory referral/consult to Endo Procedure ; Future; Expected date: 07/04/2023    Smoking 1/2 pack a day or less  -     Ambulatory referral/consult to Smoking Cessation Program; Future; Expected date: 07/10/2023      Thanks for trusting us with your healthcare needs and using [facility]. If you want to ask us a question, you can do so by replying to this message or by calling [phone].    Sincerely,    Herve Pierce M.D.      To rate your experience with Dr. Pierce please click on the link below:    [email or web address]

## 2023-07-05 ENCOUNTER — HOSPITAL ENCOUNTER (OUTPATIENT)
Dept: PREADMISSION TESTING | Facility: HOSPITAL | Age: 48
Discharge: HOME | End: 2023-07-05
Attending: INTERNAL MEDICINE
Payer: MEDICAID

## 2023-07-05 DIAGNOSIS — Z12.11 SPECIAL SCREENING FOR MALIGNANT NEOPLASMS, COLON: Primary | ICD-10-CM

## 2023-07-07 ENCOUNTER — ANESTHESIA EVENT (OUTPATIENT)
Dept: ENDOSCOPY | Facility: HOSPITAL | Age: 48
End: 2023-07-07
Payer: MEDICAID

## 2023-07-07 NOTE — ANESTHESIA PREPROCEDURE EVALUATION
07/07/2023  Sendy Zepeda is a 47 y.o., female.  Past Medical History:   Diagnosis Date    Abnormal Pap smear of cervix     History of other genital system and obstetric disorders 6/17/2021 12:07:29 PM    [facility] Historical - Quick Add: History of abnormal cervical Pap smear-No Additional Notes    Salpingo-oophoritis 6/17/2021 12:07:09 PM    [facility] Historical - Quick Add: TOA (tubo-ovarian abscess)-left ovary    TOA (tubo-ovarian abscess)      Past Surgical History:   Procedure Laterality Date    CERVICAL BIOPSY  W/ LOOP ELECTRODE EXCISION      LEFT OOPHORECTOMY      Laproscopic    RALH/BS      TUBAL LIGATION           Pre-op Assessment    I have reviewed the Patient Summary Reports.     I have reviewed the Nursing Notes. I have reviewed the NPO Status.   I have reviewed the Medications.     Review of Systems  Anesthesia Hx:  No problems with previous Anesthesia  History of prior surgery of interest to airway management or planning: Previous anesthesia: General Denies Family Hx of Anesthesia complications.   Denies Personal Hx of Anesthesia complications.   Social:  Social Alcohol Use, Smoker 1 ppd smoker.   Hematology/Oncology:  Hematology Normal        Cardiovascular:  Cardiovascular Normal     Pulmonary:  Pulmonary Normal    Renal/:  Renal/ Normal     Hepatic/GI:  Hepatic/GI Normal Bowel Prep.    Neurological:  Neurology Normal    Psych:  Psychiatric Normal           Physical Exam  General: Alert and Oriented    Airway:  Mallampati: I   Mouth Opening: Normal  TM Distance: Normal  Tongue: Normal  Neck ROM: Normal ROM    Dental:  Partial Dentures    Chest/Lungs:  Clear to auscultation, Normal Respiratory Rate    Heart:  Rate: Normal  Rhythm: Regular Rhythm        Anesthesia Plan  Type of Anesthesia, risks & benefits discussed:    Anesthesia Type: Gen Natural Airway  Intra-op Monitoring  Plan: Standard ASA Monitors  Induction:  IV  Informed Consent: Informed consent signed with the Patient and all parties understand the risks and agree with anesthesia plan.  All questions answered. Patient consented to blood products? No  ASA Score: 2  Day of Surgery Review of History & Physical: H&P Update referred to the surgeon/provider.    Ready For Surgery From Anesthesia Perspective.     .

## 2023-07-12 ENCOUNTER — HOSPITAL ENCOUNTER (OUTPATIENT)
Facility: HOSPITAL | Age: 48
Discharge: HOME | End: 2023-07-12
Attending: INTERNAL MEDICINE | Admitting: INTERNAL MEDICINE
Payer: MEDICAID

## 2023-07-12 ENCOUNTER — ANESTHESIA (OUTPATIENT)
Dept: ENDOSCOPY | Facility: HOSPITAL | Age: 48
End: 2023-07-12
Payer: MEDICAID

## 2023-07-12 VITALS
TEMPERATURE: 97 F | HEIGHT: 63 IN | SYSTOLIC BLOOD PRESSURE: 118 MMHG | HEART RATE: 61 BPM | BODY MASS INDEX: 19.96 KG/M2 | WEIGHT: 112.63 LBS | DIASTOLIC BLOOD PRESSURE: 59 MMHG | OXYGEN SATURATION: 99 % | RESPIRATION RATE: 18 BRPM

## 2023-07-12 DIAGNOSIS — Z12.11 ENCOUNTER FOR SCREENING COLONOSCOPY: Primary | ICD-10-CM

## 2023-07-12 PROCEDURE — 37000009 HC ANESTHESIA EA ADD 15 MINS: Performed by: INTERNAL MEDICINE

## 2023-07-12 PROCEDURE — 45378 PR COLONOSCOPY,DIAGNOSTIC: ICD-10-PCS | Mod: ,,, | Performed by: INTERNAL MEDICINE

## 2023-07-12 PROCEDURE — D9220A PRA ANESTHESIA: Mod: ANES,,, | Performed by: ANESTHESIOLOGY

## 2023-07-12 PROCEDURE — D9220A PRA ANESTHESIA: Mod: CRNA,,, | Performed by: NURSE ANESTHETIST, CERTIFIED REGISTERED

## 2023-07-12 PROCEDURE — 45378 DIAGNOSTIC COLONOSCOPY: CPT | Mod: ,,, | Performed by: INTERNAL MEDICINE

## 2023-07-12 PROCEDURE — 63600175 PHARM REV CODE 636 W HCPCS: Performed by: INTERNAL MEDICINE

## 2023-07-12 PROCEDURE — 00811 ANES LWR INTST NDSC NOS: CPT | Performed by: INTERNAL MEDICINE

## 2023-07-12 PROCEDURE — D9220A PRA ANESTHESIA: ICD-10-PCS | Mod: ANES,,, | Performed by: ANESTHESIOLOGY

## 2023-07-12 PROCEDURE — D9220A PRA ANESTHESIA: ICD-10-PCS | Mod: CRNA,,, | Performed by: NURSE ANESTHETIST, CERTIFIED REGISTERED

## 2023-07-12 PROCEDURE — 37000008 HC ANESTHESIA 1ST 15 MINUTES: Performed by: INTERNAL MEDICINE

## 2023-07-12 PROCEDURE — 25000003 PHARM REV CODE 250: Performed by: NURSE ANESTHETIST, CERTIFIED REGISTERED

## 2023-07-12 PROCEDURE — 45378 DIAGNOSTIC COLONOSCOPY: CPT | Performed by: INTERNAL MEDICINE

## 2023-07-12 PROCEDURE — 63600175 PHARM REV CODE 636 W HCPCS: Performed by: NURSE ANESTHETIST, CERTIFIED REGISTERED

## 2023-07-12 RX ORDER — SODIUM CHLORIDE, SODIUM LACTATE, POTASSIUM CHLORIDE, CALCIUM CHLORIDE 600; 310; 30; 20 MG/100ML; MG/100ML; MG/100ML; MG/100ML
INJECTION, SOLUTION INTRAVENOUS CONTINUOUS
Status: DISCONTINUED | OUTPATIENT
Start: 2023-07-12 | End: 2023-07-12 | Stop reason: HOSPADM

## 2023-07-12 RX ORDER — ONDANSETRON 2 MG/ML
INJECTION INTRAMUSCULAR; INTRAVENOUS
Status: DISCONTINUED | OUTPATIENT
Start: 2023-07-12 | End: 2023-07-12

## 2023-07-12 RX ORDER — PROPOFOL 10 MG/ML
VIAL (ML) INTRAVENOUS
Status: DISCONTINUED | OUTPATIENT
Start: 2023-07-12 | End: 2023-07-12

## 2023-07-12 RX ORDER — LIDOCAINE HYDROCHLORIDE 10 MG/ML
INJECTION, SOLUTION EPIDURAL; INFILTRATION; INTRACAUDAL; PERINEURAL
Status: DISCONTINUED | OUTPATIENT
Start: 2023-07-12 | End: 2023-07-12

## 2023-07-12 RX ADMIN — ONDANSETRON 4 MG: 2 INJECTION INTRAMUSCULAR; INTRAVENOUS at 01:07

## 2023-07-12 RX ADMIN — LIDOCAINE HYDROCHLORIDE 50 MG: 10 SOLUTION INTRAVENOUS at 01:07

## 2023-07-12 RX ADMIN — PROPOFOL 100 MG: 10 INJECTION, EMULSION INTRAVENOUS at 01:07

## 2023-07-12 RX ADMIN — SODIUM CHLORIDE, SODIUM LACTATE, POTASSIUM CHLORIDE, AND CALCIUM CHLORIDE: 600; 310; 30; 20 INJECTION, SOLUTION INTRAVENOUS at 01:07

## 2023-07-12 NOTE — TRANSFER OF CARE
"Anesthesia Transfer of Care Note    Patient: Sendy Zepeda    Procedure(s) Performed: Procedure(s) (LRB):  COLONOSCOPY (N/A)    Patient location: PACU    Anesthesia Type: general    Transport from OR: Transported from OR on room air with adequate spontaneous ventilation    Post pain: adequate analgesia    Post assessment: no apparent anesthetic complications    Post vital signs: stable    Level of consciousness: awake    Nausea/Vomiting: no nausea/vomiting    Complications: none    Transfer of care protocol was followed      Last vitals:   Visit Vitals  BP (!) 157/84   Pulse 68   Temp 36.5 °C (97.7 °F)   Resp 15   Ht 5' 3" (1.6 m)   Wt 51.1 kg (112 lb 10.5 oz)   LMP 09/25/2017   SpO2 99%   Breastfeeding No   BMI 19.96 kg/m²     "

## 2023-07-12 NOTE — DISCHARGE SUMMARY
[facility] 1st Fl  Discharge Note  Short Stay    Procedure(s) (LRB):  COLONOSCOPY (N/A)      OUTCOME: Patient tolerated treatment/procedure well without complication and is now ready for discharge.    DISPOSITION: Home or Self Care    FINAL DIAGNOSIS:  Encounter for screening colonoscopy    FOLLOWUP: With primary care provider    DISCHARGE INSTRUCTIONS:  No discharge procedures on file.

## 2023-07-12 NOTE — ANESTHESIA POSTPROCEDURE EVALUATION
Anesthesia Post Evaluation    Patient: Sendy Zepeda    Procedure(s) Performed: Procedure(s) (LRB):  COLONOSCOPY (N/A)    Final Anesthesia Type: general      Patient location during evaluation: PACU  Patient participation: Yes- Able to Participate  Level of consciousness: awake and alert and oriented  Post-procedure vital signs: reviewed and stable  Pain management: adequate  Airway patency: patent    PONV status at discharge: No PONV  Anesthetic complications: no      Cardiovascular status: blood pressure returned to baseline, stable and hemodynamically stable  Respiratory status: unassisted  Hydration status: euvolemic  Follow-up not needed.          Vitals Value Taken Time   /59 07/12/23 1402   Temp 36.2 °C (97.2 °F) 07/12/23 1343   Pulse 59 07/12/23 1405   Resp 26 07/12/23 1405   SpO2 100 % 07/12/23 1405   Vitals shown include unvalidated device data.      No case tracking events are documented in the log.      Pain/Hector Score: Hector Score: 10 (7/12/2023  1:53 PM)

## 2023-07-12 NOTE — H&P
Short Stay Endoscopy History and Physical    PCP - Primary Doctor No    Procedure - Colonoscopy  ASA - 2  Mallampati - per anesthesia  History of Anesthesia problems - no  Family history Anesthesia problems -  no     HPI:  This is a 47 y.o. female here for evaluation of :   Active Hospital Problems    Diagnosis  POA    *Encounter for screening colonoscopy [Z12.11]  Not Applicable      Resolved Hospital Problems   No resolved problems to display.         Health Maintenance         Date Due Completion Date    Hepatitis C Screening Never done ---    Lipid Panel Never done ---    COVID-19 Vaccine (1) Never done ---    Pneumococcal Vaccines (Age 0-64) (1 - PCV) Never done ---    HIV Screening Never done ---    TETANUS VACCINE Never done ---    High Dose Statin Never done ---    Colorectal Cancer Screening Never done ---    Mammogram 03/11/2021 3/11/2020    Influenza Vaccine (1) 09/01/2023 ---    Cervical Cancer Screening 01/10/2028 1/10/2023              ROS:  CONSTITUTIONAL: Denies weight change,  fatigue, fevers, chills, night sweats.  CARDIOVASCULAR: Denies chest pain, shortness of breath, orthopnea and edema.  RESPIRATORY: Denies cough, hemoptysis, dyspnea, and wheezing.  GI: See HPI.    Medical History:   Past Medical History:   Diagnosis Date    Abnormal Pap smear of cervix     History of other genital system and obstetric disorders 6/17/2021 12:07:29 PM    [facility] Historical - Quick Add: History of abnormal cervical Pap smear-No Additional Notes    Salpingo-oophoritis 6/17/2021 12:07:09 PM    [facility] Historical - Quick Add: TOA (tubo-ovarian abscess)-left ovary    TOA (tubo-ovarian abscess)        Surgical History:   Past Surgical History:   Procedure Laterality Date    CERVICAL BIOPSY  W/ LOOP ELECTRODE EXCISION      LEFT OOPHORECTOMY      Laproscopic    RALH/BS      TUBAL LIGATION         Family History:   Family History   Problem Relation Age of Onset    Heart disease Mother     Ovarian cancer Sister         Social History:   Social History     Tobacco Use    Smoking status: Every Day     Packs/day: 0.50     Types: Cigarettes    Smokeless tobacco: Never   Substance Use Topics    Alcohol use: No    Drug use: Yes     Types: Marijuana     Comment: every now and then       Allergies:   Review of patient's allergies indicates:   Allergen Reactions    Cefotetan     Pcn [penicillins] Swelling       Medications:   No current facility-administered medications on file prior to encounter.     Current Outpatient Medications on File Prior to Encounter   Medication Sig Dispense Refill    aspirin (ECOTRIN) 81 MG EC tablet Take 81 mg by mouth.      estradioL (ESTRACE) 0.01 % (0.1 mg/gram) vaginal cream Place 1 g vaginally 3 (three) times a week. Place 1 g vaginally 3 times a week at night. 1 each 3    estradioL (ESTRACE) 0.01 % (0.1 mg/gram) vaginal cream 1 g intravaginally daily for 2 weeks, then 1 gram intravaginally 2-3 times per week (Patient not taking: Reported on 4/10/2023) 42.5 g 0     mg tablet Take 600 mg by mouth 3 (three) times daily as needed.      ketorolac (TORADOL) 10 mg tablet Take 10 mg by mouth daily as needed.         Physical Exam:  Vital Signs:   Vitals:    07/12/23 1252   BP: (!) 157/84   Pulse: 68   Resp: 15   Temp: 97.7 °F (36.5 °C)     General Appearance: Well appearing in no acute distress  ENT: OP clear  Chest: CTA B  CV: RRR, no m/r/g  Abd: s/nt/nd/nabs  Ext: no edema    Labs:Reviewed    IMP:  Active Hospital Problems    Diagnosis  POA    *Encounter for screening colonoscopy [Z12.11]  Not Applicable      Resolved Hospital Problems   No resolved problems to display.         Plan:   I have explained the risks and benefits of colonoscopy to the patient including but not limited to bleeding, perforation, infection, and death. The patient wishes to proceed.

## 2023-07-12 NOTE — [employer] PATIENT INSTRUCTIONS
Discharge Summary/Instructions after an Endoscopic Procedure  Patient Name: Sendy Zepeda  Patient MRN: [id number]  Patient YOB: 1975 Wednesday, July 12, 2023  Aminah Weir MD  Dear patient,  As a result of recent federal legislation (The Federal Cures Act), you may   receive lab or pathology results from your procedure in your [facility]   account before your physician is able to contact you. Your physician or   their representative will relay the results to you with their   recommendations at their soonest availability.  Thank you,  RESTRICTIONS:  During your procedure today, you received medications for sedation.  These   medications may affect your judgment, balance and coordination.  Therefore,   for 24 hours, you have the following restrictions:   - DO NOT drive a car, operate machinery, make legal/financial decisions,   sign important papers or drink alcohol.    ACTIVITY:  Today: no heavy lifting, straining or running due to procedural   sedation/anesthesia.  The following day: return to full activity including work.  DIET:  Eat and drink normally unless instructed otherwise.     TREATMENT FOR COMMON SIDE EFFECTS:  - Mild abdominal pain, nausea, belching, bloating or excessive gas:  rest,   eat lightly and use a heating pad.  - Sore Throat: treat with throat lozenges and/or gargle with warm salt   water.  - Because air was used during the procedure, expelling large amounts of air   from your rectum or belching is normal.  - If a bowel prep was taken, you may not have a bowel movement for 1-3 days.    This is normal.  SYMPTOMS TO WATCH FOR AND REPORT TO YOUR PHYSICIAN:  1. Abdominal pain or bloating, other than gas cramps.  2. Chest pain.  3. Back pain.  4. Signs of infection such as: chills or fever occurring within 24 hours   after the procedure.  5. Rectal bleeding, which would show as bright red, maroon, or black stools.   (A tablespoon of blood from the rectum is not serious, especially  if   hemorrhoids are present.)  6. Vomiting.  7. Weakness or dizziness.  GO DIRECTLY TO THE NEAREST EMERGENCY ROOM IF YOU HAVE ANY OF THE FOLLOWING:      Difficulty breathing              Chills and/or fever over 101 F   Persistent vomiting and/or vomiting blood   Severe abdominal pain   Severe chest pain   Black, tarry stools   Bleeding- more than one tablespoon   Any other symptom or condition that you feel may need urgent attention  Your doctor recommends these additional instructions:  If any biopsies were taken, your doctors clinic will contact you in 1 to 2   weeks with any results.  - Discharge patient to home (via wheelchair).   - Resume previous diet.   - Continue present medications.   - Repeat colonoscopy in 10 years for screening purposes.   - Patient has a contact number available for emergencies.  The signs and   symptoms of potential delayed complications were discussed with the   patient.  Return to normal activities tomorrow.  Written discharge   instructions were provided to the patient.  For questions, problems or results please call your physician Aminah Weir MD at Work:  [phone]  If you have any questions about the above instructions, call the GI   department at [phone] or call the endoscopy unit at [phone]   from 7am until 3 pm.  [facility], EMERGENCY ROOM PHONE NUMBER:   [phone]  IF A COMPLICATION OR EMERGENCY SITUATION ARISES AND YOU ARE UNABLE TO REACH   YOUR PHYSICIAN - GO DIRECTLY TO THE EMERGENCY ROOM.  I have read or have had read to me these discharge instructions for my   procedure and have received a written copy.  I understand these   instructions and will follow-up with my physician if I have any questions.     __________________________________       _____________________________________  Nurse Signature                                          Patient/Designated   Responsible Party Signature  MD Aminah Perez  CLEVELAND Weir MD  7/12/2023 1:41:17 PM  [employer]

## 2023-07-25 ENCOUNTER — CLINICAL SUPPORT (OUTPATIENT)
Dept: SMOKING CESSATION | Facility: CLINIC | Age: 48
End: 2023-07-25

## 2023-07-25 DIAGNOSIS — F17.200 NICOTINE DEPENDENCE: Primary | ICD-10-CM

## 2023-07-25 PROCEDURE — 99404 PREV MED CNSL INDIV APPRX 60: CPT | Mod: S$GLB,,, | Performed by: GENERAL PRACTICE

## 2023-07-25 PROCEDURE — 99404 PR PREVENT COUNSEL,INDIV,60 MIN: ICD-10-PCS | Mod: S$GLB,,, | Performed by: GENERAL PRACTICE

## 2023-07-25 PROCEDURE — 99999 PR PBB SHADOW E&M-EST. PATIENT-LVL II: ICD-10-PCS | Mod: PBBFAC,,,

## 2023-07-25 PROCEDURE — 99999 PR PBB SHADOW E&M-EST. PATIENT-LVL II: CPT | Mod: PBBFAC,,,

## 2023-07-25 RX ORDER — MICONAZOLE NITRATE 2 %
2 CREAM (GRAM) TOPICAL
Qty: 300 EACH | Refills: 0 | Status: SHIPPED | OUTPATIENT
Start: 2023-07-25

## 2023-07-25 NOTE — PROGRESS NOTES
Quit attempt 1. Patient states that she has only not smoked when she was in the hospital. She is wanting to use the least invasive cessation medication due to fear of side effects. 1 pack of cigarettes will last 2 days.

## 2023-08-01 ENCOUNTER — CLINICAL SUPPORT (OUTPATIENT)
Dept: SMOKING CESSATION | Facility: CLINIC | Age: 48
End: 2023-08-01

## 2023-08-01 DIAGNOSIS — F17.200 NICOTINE DEPENDENCE, UNCOMPLICATED: Primary | ICD-10-CM

## 2023-08-01 PROCEDURE — 99999 PR PBB SHADOW E&M-EST. PATIENT-LVL I: CPT | Mod: PBBFAC,,,

## 2023-08-01 PROCEDURE — 99999 PR PBB SHADOW E&M-EST. PATIENT-LVL I: ICD-10-PCS | Mod: PBBFAC,,,

## 2023-08-01 PROCEDURE — 99403 PR PREVENT COUNSEL,INDIV,45 MIN: ICD-10-PCS | Mod: S$GLB,,, | Performed by: GENERAL PRACTICE

## 2023-08-01 PROCEDURE — 99403 PREV MED CNSL INDIV APPRX 45: CPT | Mod: S$GLB,,, | Performed by: GENERAL PRACTICE

## 2023-08-01 NOTE — PROGRESS NOTES
Individual Follow-Up Form    8/1/2023    Quit Date: TBD    Clinical Status of Patient: Outpatient    Length of Service: 45 minutes    Continuing Medication: yes  Nicotine gum    Other Medications: None     Target Symptoms: Withdrawal and medication side effects. The following were  rated moderate (3) to severe (4) on TCRS:  Moderate (3): Craves/ Desires  Severe (4): None    Comments: Telephone visit because of CTTS out sick. PT is using gum but states it harder to use because of her dentures. Instructed proper way to use and park after it softens up. PT was able to reduce to under 10 cigarettes per day with just the gum. Congratulated PT on her success so far. Completion of TCRS (Tobacco Cessation Rating Scale) reviewed strategies, cues, and triggers. Introduced the negative impact of tobacco on health, the health advantages of discontinuing the use of tobacco, time line improved health changes after a quit, withdrawal issues to expect from nicotine and habit, and ways to achieve the goal of a quit. The patient remains to use 2 mg gum in place of cigarette. The patient will continue with group therapy sessions and medication monitoring by CTTS. Prescribed medication management will be by physician. The patient denies any abnormal behavioral or mental changes at this time.     Diagnosis: F17.210    Next Visit: 2 weeks

## 2023-08-22 ENCOUNTER — CLINICAL SUPPORT (OUTPATIENT)
Dept: SMOKING CESSATION | Facility: CLINIC | Age: 48
End: 2023-08-22

## 2023-08-22 DIAGNOSIS — F17.210 MODERATE SMOKER (20 OR LESS PER DAY): Primary | ICD-10-CM

## 2023-08-22 PROCEDURE — 99999 PR PBB SHADOW E&M-EST. PATIENT-LVL I: ICD-10-PCS | Mod: PBBFAC,,,

## 2023-08-22 PROCEDURE — 99402 PR PREVENT COUNSEL,INDIV,30 MIN: ICD-10-PCS | Mod: S$GLB,,, | Performed by: GENERAL PRACTICE

## 2023-08-22 PROCEDURE — 99999 PR PBB SHADOW E&M-EST. PATIENT-LVL I: CPT | Mod: PBBFAC,,,

## 2023-08-22 PROCEDURE — 99402 PREV MED CNSL INDIV APPRX 30: CPT | Mod: S$GLB,,, | Performed by: GENERAL PRACTICE

## 2023-08-22 RX ORDER — IBUPROFEN 200 MG
1 TABLET ORAL DAILY
Qty: 28 PATCH | Refills: 0 | Status: SHIPPED | OUTPATIENT
Start: 2023-08-22

## 2023-08-22 NOTE — PROGRESS NOTES
Individual Follow-Up Form    8/22/2023    Quit Date: TBD    Clinical Status of Patient: Outpatient    Length of Service: 30 minutes    Continuing Medication: no    Other Medications: None     Target Symptoms: Withdrawal and medication side effects. The following were  rated moderate (3) to severe (4) on TCRS:  Moderate (3): Craves/ desires. Gum sticking to her dentures and causing problems  Severe (4): None    Comments: Telephone visit because CTTS out sick. PT states that she can not do the gum because it is messing with her plate for her dentures. She stopped using it and asked for patches. She has been smoking a little more because her niece was in an accident and she was stressed out. PT is smoking approximately 10 cigarettes per day. After discussion of habits, placed PT on 14 mg patch QD. PT will start patches ASAP. Completion of TCRS (Tobacco Cessation Rating Scale) reviewed strategies, habitual behavior, stress, and high risk situations. Introduced stress with addition interventions, SOLVE, relaxation with interventions, nutrition, exercise, weight gain, and the importance of rewarding oneself for accomplishments toward becoming tobacco free. Open discussion of all items with interventions. The patient will continue with group therapy sessions and medication monitoring by CTTS. Prescribed medication management will be by physician. The patient denies any abnormal behavioral or mental changes at this time.     Diagnosis: F17.210    Next Visit: 1 week

## 2023-08-29 ENCOUNTER — CLINICAL SUPPORT (OUTPATIENT)
Dept: SMOKING CESSATION | Facility: CLINIC | Age: 48
End: 2023-08-29

## 2023-08-29 DIAGNOSIS — F17.200 LIGHT TOBACCO SMOKER: Primary | ICD-10-CM

## 2023-08-29 PROCEDURE — 99999 PR PBB SHADOW E&M-EST. PATIENT-LVL I: CPT | Mod: PBBFAC,,,

## 2023-08-29 PROCEDURE — 99402 PREV MED CNSL INDIV APPRX 30: CPT | Mod: S$GLB,,, | Performed by: GENERAL PRACTICE

## 2023-08-29 PROCEDURE — 99999 PR PBB SHADOW E&M-EST. PATIENT-LVL I: ICD-10-PCS | Mod: PBBFAC,,,

## 2023-08-29 PROCEDURE — 99402 PR PREVENT COUNSEL,INDIV,30 MIN: ICD-10-PCS | Mod: S$GLB,,, | Performed by: GENERAL PRACTICE

## 2023-08-29 NOTE — PROGRESS NOTES
Individual Follow-Up Form    8/29/2023    Quit Date: TBD    Clinical Status of Patient: Outpatient    Length of Service: 30 minutes    Continuing Medication: no    Other Medications: None     Target Symptoms: Withdrawal and medication side effects. The following were  rated moderate (3) to severe (4) on TCRS:  Moderate (3): None  Severe (4): None    Comments: Telephone visit because CTTS is out sick. PT is not smoking as many cigarettes per day. Yesterday she only smoked six cigarettes. She said she went 5 hours without one and when she smelled her  smoking, she wanted one. She has not started her patches yet because she wants to ensure that she is not going to get sick or feel funny from wearing them at work. PT has decided to start wearing patched beginning this weekend. Completion of TCRS (Tobacco Cessation Rating Scale) reviewed strategies, controlling environment, cues, triggers, new goals set. Introduced high risk situations with preparation interventions, caffeine similarities with withdrawal issues of habit and nicotine, Alcohol, Understanding urges, cravings, stress and relaxation. Open discussion with intervention discussion. The patient will begin the prescribed tobacco cessation medication regimen of 14 mg patch. The patient will continue with group therapy sessions and medication monitoring by [facility]. Prescribed medication management will be by physician. The patient denies any abnormal behavioral or mental changes at this time.       Diagnosis: F17.200    Next Visit: 2 weeks

## 2023-09-12 ENCOUNTER — CLINICAL SUPPORT (OUTPATIENT)
Dept: SMOKING CESSATION | Facility: CLINIC | Age: 48
End: 2023-09-12

## 2023-09-12 DIAGNOSIS — F17.200 NICOTINE DEPENDENCE: Primary | ICD-10-CM

## 2023-09-12 PROCEDURE — 99999 PR PBB SHADOW E&M-EST. PATIENT-LVL I: ICD-10-PCS | Mod: PBBFAC,,,

## 2023-09-12 PROCEDURE — 99999 PR PBB SHADOW E&M-EST. PATIENT-LVL I: CPT | Mod: PBBFAC,,,

## 2023-09-12 PROCEDURE — 99401 PREV MED CNSL INDIV APPRX 15: CPT | Mod: S$GLB,,, | Performed by: GENERAL PRACTICE

## 2023-09-12 PROCEDURE — 99401 PR PREVENT COUNSEL,INDIV,15 MIN: ICD-10-PCS | Mod: S$GLB,,, | Performed by: GENERAL PRACTICE

## 2023-09-12 NOTE — PROGRESS NOTES
Individual Follow-Up Form    9/12/2023    Quit Date: TBD    Clinical Status of Patient: Outpatient    Length of Service: 15 minutes    Continuing Medication: no    Other Medications: none     Target Symptoms: Withdrawal and medication side effects. The following were  rated moderate (3) to severe (4) on TCRS:  Moderate (3): desire tobacco  Severe (4): none    Comments: Spoke with patient in regards to her smoking cessation progress. She states that she is at work and is not able to talk for long. She states that she needs later appointments moving forward due to her work schedule. She stated that she started using the patches but was continuing to smoke. She got sick to her stomach when smoking so she has not been using the patches for the last few days. Her  smokes and she stated that she has a hard time refraining when around him. Discussed coping strategies. She denies any negative thoughts or behavior at this time. Encouraged attempting to use the patches with no smoking. She verbalized that she might try again. Will continue to encourage and monitor progress.    Diagnosis: F17.200    Next Visit: 2 weeks

## 2023-10-19 ENCOUNTER — CLINICAL SUPPORT (OUTPATIENT)
Dept: SMOKING CESSATION | Facility: CLINIC | Age: 48
End: 2023-10-19

## 2023-10-19 DIAGNOSIS — F17.200 NICOTINE DEPENDENCE: Primary | ICD-10-CM

## 2023-10-19 PROCEDURE — 99407 BEHAV CHNG SMOKING > 10 MIN: CPT | Mod: S$GLB,,,

## 2023-10-19 PROCEDURE — 99407 PR TOBACCO USE CESSATION INTENSIVE >10 MINUTES: ICD-10-PCS | Mod: S$GLB,,,

## 2023-10-19 NOTE — PROGRESS NOTES
Called pt to f/u on her 3 month smoking cessation quit status. Pt stated she is still smoking, but still working on her quit. Pt stated she was at work. Informed her of benefit period, phone follow ups, and contact information. Will complete smart form and will continue to follow up on quit #1 episode.

## 2024-01-25 ENCOUNTER — CLINICAL SUPPORT (OUTPATIENT)
Dept: SMOKING CESSATION | Facility: CLINIC | Age: 49
End: 2024-01-25

## 2024-01-25 DIAGNOSIS — F17.200 NICOTINE DEPENDENCE: Primary | ICD-10-CM

## 2024-01-25 PROCEDURE — 99999 PR PBB SHADOW E&M-EST. PATIENT-LVL I: CPT | Mod: PBBFAC,,,

## 2024-01-29 NOTE — PROGRESS NOTES
Spoke with patient today in regard to smoking cessation progress for 6 month telephone follow up, she states not tobacco free. Patient states no interest in returning to the program at this time and will call when ready. Informed patient of benefit period, future follow ups, and contact information if any further help or support is needed. Will complete smart form for 6 month follow up on Quit attempt #1.

## 2024-07-17 ENCOUNTER — CLINICAL SUPPORT (OUTPATIENT)
Dept: SMOKING CESSATION | Facility: CLINIC | Age: 49
End: 2024-07-17

## 2024-07-17 DIAGNOSIS — F17.200 NICOTINE DEPENDENCE: Primary | ICD-10-CM

## 2024-11-16 ENCOUNTER — HOSPITAL ENCOUNTER (EMERGENCY)
Facility: HOSPITAL | Age: 49
Discharge: HOME | End: 2024-11-16
Attending: EMERGENCY MEDICINE
Payer: MEDICAID

## 2024-11-16 VITALS
RESPIRATION RATE: 18 BRPM | HEART RATE: 88 BPM | WEIGHT: 120 LBS | TEMPERATURE: 98 F | HEIGHT: 68 IN | BODY MASS INDEX: 18.19 KG/M2 | OXYGEN SATURATION: 99 % | SYSTOLIC BLOOD PRESSURE: 103 MMHG | DIASTOLIC BLOOD PRESSURE: 64 MMHG

## 2024-11-16 DIAGNOSIS — R55 NEAR SYNCOPE: Primary | ICD-10-CM

## 2024-11-16 DIAGNOSIS — R19.7 DIARRHEA, UNSPECIFIED TYPE: ICD-10-CM

## 2024-11-16 DIAGNOSIS — R53.1 WEAKNESS: ICD-10-CM

## 2024-11-16 LAB
ALBUMIN SERPL BCP-MCNC: 3.6 G/DL (ref 3.5–5.2)
ALP SERPL-CCNC: 61 U/L (ref 40–150)
ALT SERPL W/O P-5'-P-CCNC: 11 U/L (ref 10–44)
ANION GAP SERPL CALC-SCNC: 13 MMOL/L (ref 8–16)
AST SERPL-CCNC: 11 U/L (ref 10–40)
BASOPHILS # BLD AUTO: 0.03 K/UL (ref 0–0.2)
BASOPHILS NFR BLD: 0.3 % (ref 0–1.9)
BILIRUB SERPL-MCNC: 0.6 MG/DL (ref 0.1–1)
BUN SERPL-MCNC: 11 MG/DL (ref 6–20)
CALCIUM SERPL-MCNC: 9.4 MG/DL (ref 8.7–10.5)
CHLORIDE SERPL-SCNC: 104 MMOL/L (ref 95–110)
CO2 SERPL-SCNC: 23 MMOL/L (ref 23–29)
CREAT SERPL-MCNC: 0.8 MG/DL (ref 0.5–1.4)
DIFFERENTIAL METHOD BLD: ABNORMAL
EOSINOPHIL # BLD AUTO: 0 K/UL (ref 0–0.5)
EOSINOPHIL NFR BLD: 0.4 % (ref 0–8)
ERYTHROCYTE [DISTWIDTH] IN BLOOD BY AUTOMATED COUNT: 11.8 % (ref 11.5–14.5)
EST. GFR  (NO RACE VARIABLE): >60 ML/MIN/1.73 M^2
GLUCOSE SERPL-MCNC: 138 MG/DL (ref 70–110)
HCT VFR BLD AUTO: 36.4 % (ref 37–48.5)
HGB BLD-MCNC: 12.7 G/DL (ref 12–16)
IMM GRANULOCYTES # BLD AUTO: 0.02 K/UL (ref 0–0.04)
IMM GRANULOCYTES NFR BLD AUTO: 0.2 % (ref 0–0.5)
LYMPHOCYTES # BLD AUTO: 3.5 K/UL (ref 1–4.8)
LYMPHOCYTES NFR BLD: 35.5 % (ref 18–48)
MCH RBC QN AUTO: 34.3 PG (ref 27–31)
MCHC RBC AUTO-ENTMCNC: 34.9 G/DL (ref 32–36)
MCV RBC AUTO: 98 FL (ref 82–98)
MONOCYTES # BLD AUTO: 0.7 K/UL (ref 0.3–1)
MONOCYTES NFR BLD: 7.4 % (ref 4–15)
NEUTROPHILS # BLD AUTO: 5.5 K/UL (ref 1.8–7.7)
NEUTROPHILS NFR BLD: 56.2 % (ref 38–73)
NRBC BLD-RTO: 0 /100 WBC
PLATELET # BLD AUTO: 179 K/UL (ref 150–450)
PMV BLD AUTO: 9.9 FL (ref 9.2–12.9)
POTASSIUM SERPL-SCNC: 3.2 MMOL/L (ref 3.5–5.1)
PROT SERPL-MCNC: 7.1 G/DL (ref 6–8.4)
RBC # BLD AUTO: 3.7 M/UL (ref 4–5.4)
SODIUM SERPL-SCNC: 140 MMOL/L (ref 136–145)
TROPONIN I SERPL DL<=0.01 NG/ML-MCNC: <0.006 NG/ML (ref 0–0.03)
WBC # BLD AUTO: 9.78 K/UL (ref 3.9–12.7)

## 2024-11-16 PROCEDURE — 93010 ELECTROCARDIOGRAM REPORT: CPT | Mod: ,,, | Performed by: INTERNAL MEDICINE

## 2024-11-16 PROCEDURE — 80053 COMPREHEN METABOLIC PANEL: CPT | Mod: ER | Performed by: NURSE PRACTITIONER

## 2024-11-16 PROCEDURE — 86803 HEPATITIS C AB TEST: CPT | Performed by: EMERGENCY MEDICINE

## 2024-11-16 PROCEDURE — 93005 ELECTROCARDIOGRAM TRACING: CPT | Mod: ER

## 2024-11-16 PROCEDURE — 85025 COMPLETE CBC W/AUTO DIFF WBC: CPT | Mod: ER | Performed by: NURSE PRACTITIONER

## 2024-11-16 PROCEDURE — 87389 HIV-1 AG W/HIV-1&-2 AB AG IA: CPT | Performed by: EMERGENCY MEDICINE

## 2024-11-16 PROCEDURE — 99284 EMERGENCY DEPT VISIT MOD MDM: CPT | Mod: 25,ER

## 2024-11-16 PROCEDURE — 84484 ASSAY OF TROPONIN QUANT: CPT | Mod: ER | Performed by: NURSE PRACTITIONER

## 2024-11-16 NOTE — ED PROVIDER NOTES
Encounter Date: 11/16/2024       History     Chief Complaint   Patient presents with    Weakness     Pt admits having a vascular procedure done on Wednesday. Has been in bed, admits drinking a lot of fluids but not eating much solid food. Today when she got out of bed he had generalized weakness. Denies pain. Denies nausea, vomiting diarrhea. Speech clear, transferred from wheelchair to bed without incident.      49-year-old female with complaint of near syncopal episode after she was lying down for several hours and stood up to walk around her house.  Patient reports she became lightheaded and her heart started to beat fast. Patient denies chest pain.  Patient denies shortness of breath.  Patient had bilateral stents placed in lower legs 3 days ago because of peripheral artery disease and obstruction lower legs.  Patient denies any pain in legs from angiogram.        Review of patient's allergies indicates:   Allergen Reactions    Cefotetan     Pcn [penicillins] Swelling     Past Medical History:   Diagnosis Date    Abnormal Pap smear of cervix     History of other genital system and obstetric disorders 6/17/2021 12:07:29 PM    [facility] Historical - Quick Add: History of abnormal cervical Pap smear-No Additional Notes    Salpingo-oophoritis 6/17/2021 12:07:09 PM    [facility] Historical - Quick Add: TOA (tubo-ovarian abscess)-left ovary    TOA (tubo-ovarian abscess)      Past Surgical History:   Procedure Laterality Date    CERVICAL BIOPSY  W/ LOOP ELECTRODE EXCISION      COLONOSCOPY N/A 7/12/2023    Procedure: COLONOSCOPY;  Surgeon: Aminah Weir MD;  Location: [facility];  Service: Endoscopy;  Laterality: N/A;    LEFT OOPHORECTOMY      Laproscopic    RALH/BS      TUBAL LIGATION       Family History   Problem Relation Name Age of Onset    Heart disease Mother      Ovarian cancer Sister       Social History     Tobacco Use    Smoking status: Every Day     Current packs/day: 0.50     Types: Cigarettes     Smokeless tobacco: Never   Substance Use Topics    Alcohol use: No    Drug use: Yes     Types: Marijuana     Comment: every now and then     Review of Systems   Constitutional:  Negative for fever.   HENT:  Negative for sore throat.    Respiratory:  Negative for shortness of breath.    Cardiovascular:  Negative for chest pain.   Gastrointestinal:  Negative for nausea.   Genitourinary:  Negative for dysuria.   Musculoskeletal:  Negative for back pain.   Skin:  Negative for rash.   Neurological:  Positive for weakness.   Hematological:  Does not bruise/bleed easily.       Physical Exam     Initial Vitals [11/16/24 1626]   BP Pulse Resp Temp SpO2   (!) 123/59 83 18 98.2 °F (36.8 °C) 99 %      MAP       --         Physical Exam    Nursing note and vitals reviewed.  Constitutional: She appears well-developed and well-nourished.   HENT:   Head: Normocephalic and atraumatic.   Eyes: Conjunctivae and EOM are normal. Pupils are equal, round, and reactive to light.   Neck: Neck supple.   Normal range of motion.  Cardiovascular:  Normal rate, regular rhythm, normal heart sounds and intact distal pulses.           Pulmonary/Chest: Breath sounds normal.   Abdominal: Abdomen is soft. There is no abdominal tenderness. There is no rebound and no guarding.   Musculoskeletal:         General: Normal range of motion.      Cervical back: Normal range of motion and neck supple.     Neurological: She is alert and oriented to person, place, and time. She has normal strength and normal reflexes.   Skin: Skin is warm and dry.   Psychiatric: She has a normal mood and affect. Her behavior is normal. Thought content normal.       Labs Reviewed   CBC W/ AUTO DIFFERENTIAL - Abnormal       Result Value    WBC 9.78      RBC 3.70 (*)     Hemoglobin 12.7      Hematocrit 36.4 (*)     MCV 98      MCH 34.3 (*)     MCHC 34.9      RDW 11.8      Platelets 179      MPV 9.9      Immature Granulocytes 0.2      Gran # (ANC) 5.5      Immature Grans (Abs) 0.02       Lymph # 3.5      Mono # 0.7      Eos # 0.0      Baso # 0.03      nRBC 0      Gran % 56.2      Lymph % 35.5      Mono % 7.4      Eosinophil % 0.4      Basophil % 0.3      Differential Method Automated     COMPREHENSIVE METABOLIC PANEL - Abnormal    Sodium 140      Potassium 3.2 (*)     Chloride 104      CO2 23      Glucose 138 (*)     BUN 11      Creatinine 0.8      Calcium 9.4      Total Protein 7.1      Albumin 3.6      Total Bilirubin 0.6      Alkaline Phosphatase 61      AST 11      ALT 11      eGFR >60.0      Anion Gap 13     TROPONIN I    Troponin I <0.006     HEPATITIS C ANTIBODY   HIV 1 / 2 ANTIBODY         ED Course   Procedures  Labs Reviewed   CBC W/ AUTO DIFFERENTIAL - Abnormal       Result Value    WBC 9.78      RBC 3.70 (*)     Hemoglobin 12.7      Hematocrit 36.4 (*)     MCV 98      MCH 34.3 (*)     MCHC 34.9      RDW 11.8      Platelets 179      MPV 9.9      Immature Granulocytes 0.2      Gran # (ANC) 5.5      Immature Grans (Abs) 0.02      Lymph # 3.5      Mono # 0.7      Eos # 0.0      Baso # 0.03      nRBC 0      Gran % 56.2      Lymph % 35.5      Mono % 7.4      Eosinophil % 0.4      Basophil % 0.3      Differential Method Automated     COMPREHENSIVE METABOLIC PANEL - Abnormal    Sodium 140      Potassium 3.2 (*)     Chloride 104      CO2 23      Glucose 138 (*)     BUN 11      Creatinine 0.8      Calcium 9.4      Total Protein 7.1      Albumin 3.6      Total Bilirubin 0.6      Alkaline Phosphatase 61      AST 11      ALT 11      eGFR >60.0      Anion Gap 13     TROPONIN I    Troponin I <0.006     HEPATITIS C ANTIBODY   HIV 1 / 2 ANTIBODY     EKG Readings: (Independently Interpreted)   Other EKG Interpretations: EKG: NSR with rate of 75, no st or t wave abnormalities       Imaging Results    None          Medications - No data to display  Medical Decision Making  6:13 PM  Patient reports on exam that she has been having some diarrhea with a past couple days.  Patient appeared to have had an  orthostatic episode without syncope.  Patient tolerated p.o. fluids without difficulty.  Heart rate 88.  Patient will be discharged and patient will return for any worsening.    Amount and/or Complexity of Data Reviewed  Labs: ordered.                                      Clinical Impression:  Final diagnoses:  [R53.1] Weakness  [R55] Near syncope (Primary)  [R19.7] Diarrhea, unspecified type          ED Disposition Condition    Discharge Stable          ED Prescriptions    None       Follow-up Information       Follow up With Specialties Details Why Contact Info    PCP  Schedule an appointment as soon as possible for a visit in 2 days               Sarabjit Herrera NP  11/16/24 1735

## 2024-11-17 LAB
HCV AB SERPL QL IA: NEGATIVE
HIV 1+2 AB+HIV1 P24 AG SERPL QL IA: NEGATIVE
OHS QRS DURATION: 90 MS
OHS QTC CALCULATION: 410 MS

## 2025-01-07 NOTE — PROGRESS NOTES
Spoke with patient today in regard to smoking cessation progress 12 month telephone follow up, she states that she is not tobacco free.  Patient states that she trimmed down to 4-5 cigarettes daily.  Commended patient on the accomplishments thus far.  Informed patient of benefit period, future follow up, and contact information if any further help or support is needed.  Will complete smart form for 12 month follow up on Quit attempt #1 and resolve episode.     
Detail Level: Detailed
Quality 47: Advance Care Plan: Advance Care Planning discussed and documented; advance care plan or surrogate decision maker documented in the medical record.
Quality 226: Preventive Care And Screening: Tobacco Use: Screening And Cessation Intervention: Patient screened for tobacco use and is an ex/non-smoker